# Patient Record
Sex: MALE | Race: WHITE | NOT HISPANIC OR LATINO | Employment: OTHER | URBAN - METROPOLITAN AREA
[De-identification: names, ages, dates, MRNs, and addresses within clinical notes are randomized per-mention and may not be internally consistent; named-entity substitution may affect disease eponyms.]

---

## 2017-05-16 ENCOUNTER — RECORDS - HEALTHEAST (OUTPATIENT)
Dept: LAB | Facility: CLINIC | Age: 72
End: 2017-05-16

## 2017-05-16 LAB
CHOLEST SERPL-MCNC: 207 MG/DL
FASTING STATUS PATIENT QL REPORTED: YES
HDLC SERPL-MCNC: 38 MG/DL
LDLC SERPL CALC-MCNC: 149 MG/DL
PSA SERPL-MCNC: 0.6 NG/ML (ref 0–6.5)
TRIGL SERPL-MCNC: 100 MG/DL

## 2018-06-05 ENCOUNTER — RECORDS - HEALTHEAST (OUTPATIENT)
Dept: LAB | Facility: CLINIC | Age: 73
End: 2018-06-05

## 2018-06-05 LAB
ALBUMIN SERPL-MCNC: 4 G/DL (ref 3.5–5)
ALP SERPL-CCNC: 46 U/L (ref 45–120)
ALT SERPL W P-5'-P-CCNC: 30 U/L (ref 0–45)
ANION GAP SERPL CALCULATED.3IONS-SCNC: 13 MMOL/L (ref 5–18)
AST SERPL W P-5'-P-CCNC: 40 U/L (ref 0–40)
BILIRUB SERPL-MCNC: 0.9 MG/DL (ref 0–1)
BUN SERPL-MCNC: 16 MG/DL (ref 8–28)
CALCIUM SERPL-MCNC: 9.2 MG/DL (ref 8.5–10.5)
CHLORIDE BLD-SCNC: 107 MMOL/L (ref 98–107)
CHOLEST SERPL-MCNC: 182 MG/DL
CO2 SERPL-SCNC: 21 MMOL/L (ref 22–31)
CREAT SERPL-MCNC: 0.81 MG/DL (ref 0.7–1.3)
FASTING STATUS PATIENT QL REPORTED: YES
GFR SERPL CREATININE-BSD FRML MDRD: >60 ML/MIN/1.73M2
GLUCOSE BLD-MCNC: 108 MG/DL (ref 70–125)
HDLC SERPL-MCNC: 35 MG/DL
LDLC SERPL CALC-MCNC: 118 MG/DL
POTASSIUM BLD-SCNC: 4.2 MMOL/L (ref 3.5–5)
PROT SERPL-MCNC: 7.5 G/DL (ref 6–8)
PSA SERPL-MCNC: 1 NG/ML (ref 0–6.5)
SODIUM SERPL-SCNC: 141 MMOL/L (ref 136–145)
TRIGL SERPL-MCNC: 147 MG/DL

## 2019-01-21 ENCOUNTER — RECORDS - HEALTHEAST (OUTPATIENT)
Dept: LAB | Facility: CLINIC | Age: 74
End: 2019-01-21

## 2019-01-22 LAB
ANION GAP SERPL CALCULATED.3IONS-SCNC: 15 MMOL/L (ref 5–18)
BUN SERPL-MCNC: 16 MG/DL (ref 8–28)
CALCIUM SERPL-MCNC: 9.3 MG/DL (ref 8.5–10.5)
CHLORIDE BLD-SCNC: 105 MMOL/L (ref 98–107)
CO2 SERPL-SCNC: 20 MMOL/L (ref 22–31)
CREAT SERPL-MCNC: 0.8 MG/DL (ref 0.7–1.3)
GFR SERPL CREATININE-BSD FRML MDRD: >60 ML/MIN/1.73M2
GLUCOSE BLD-MCNC: 104 MG/DL (ref 70–125)
POTASSIUM BLD-SCNC: 4.1 MMOL/L (ref 3.5–5)
SODIUM SERPL-SCNC: 140 MMOL/L (ref 136–145)

## 2019-06-06 ENCOUNTER — RECORDS - HEALTHEAST (OUTPATIENT)
Dept: LAB | Facility: CLINIC | Age: 74
End: 2019-06-06

## 2019-06-06 LAB
ALBUMIN SERPL-MCNC: 3.9 G/DL (ref 3.5–5)
ALP SERPL-CCNC: 43 U/L (ref 45–120)
ALT SERPL W P-5'-P-CCNC: 34 U/L (ref 0–45)
ANION GAP SERPL CALCULATED.3IONS-SCNC: 10 MMOL/L (ref 5–18)
AST SERPL W P-5'-P-CCNC: 36 U/L (ref 0–40)
BILIRUB SERPL-MCNC: 0.9 MG/DL (ref 0–1)
BUN SERPL-MCNC: 11 MG/DL (ref 8–28)
CALCIUM SERPL-MCNC: 9 MG/DL (ref 8.5–10.5)
CHLORIDE BLD-SCNC: 107 MMOL/L (ref 98–107)
CHOLEST SERPL-MCNC: 191 MG/DL
CO2 SERPL-SCNC: 20 MMOL/L (ref 22–31)
CREAT SERPL-MCNC: 0.74 MG/DL (ref 0.7–1.3)
FASTING STATUS PATIENT QL REPORTED: YES
GFR SERPL CREATININE-BSD FRML MDRD: >60 ML/MIN/1.73M2
GLUCOSE BLD-MCNC: 122 MG/DL (ref 70–125)
HDLC SERPL-MCNC: 38 MG/DL
LDLC SERPL CALC-MCNC: 121 MG/DL
POTASSIUM BLD-SCNC: 4.2 MMOL/L (ref 3.5–5)
PROT SERPL-MCNC: 7.4 G/DL (ref 6–8)
PSA SERPL-MCNC: 0.7 NG/ML (ref 0–6.5)
SODIUM SERPL-SCNC: 137 MMOL/L (ref 136–145)
TRIGL SERPL-MCNC: 159 MG/DL

## 2020-09-29 ENCOUNTER — RECORDS - HEALTHEAST (OUTPATIENT)
Dept: LAB | Facility: CLINIC | Age: 75
End: 2020-09-29

## 2020-09-29 LAB
ALBUMIN SERPL-MCNC: 3.8 G/DL (ref 3.5–5)
ALP SERPL-CCNC: 43 U/L (ref 45–120)
ALT SERPL W P-5'-P-CCNC: 23 U/L (ref 0–45)
ANION GAP SERPL CALCULATED.3IONS-SCNC: 10 MMOL/L (ref 5–18)
AST SERPL W P-5'-P-CCNC: 26 U/L (ref 0–40)
BILIRUB SERPL-MCNC: 0.6 MG/DL (ref 0–1)
BUN SERPL-MCNC: 11 MG/DL (ref 8–28)
CALCIUM SERPL-MCNC: 8.8 MG/DL (ref 8.5–10.5)
CHLORIDE BLD-SCNC: 104 MMOL/L (ref 98–107)
CHOLEST SERPL-MCNC: 222 MG/DL
CO2 SERPL-SCNC: 24 MMOL/L (ref 22–31)
CREAT SERPL-MCNC: 0.79 MG/DL (ref 0.7–1.3)
FASTING STATUS PATIENT QL REPORTED: YES
GFR SERPL CREATININE-BSD FRML MDRD: >60 ML/MIN/1.73M2
GLUCOSE BLD-MCNC: 134 MG/DL (ref 70–125)
HDLC SERPL-MCNC: 34 MG/DL
LDLC SERPL CALC-MCNC: 113 MG/DL
LDLC SERPL CALC-MCNC: ABNORMAL MG/DL
POTASSIUM BLD-SCNC: 4.3 MMOL/L (ref 3.5–5)
PROT SERPL-MCNC: 7.8 G/DL (ref 6–8)
PSA SERPL-MCNC: 0.9 NG/ML (ref 0–6.5)
SODIUM SERPL-SCNC: 138 MMOL/L (ref 136–145)
TRIGL SERPL-MCNC: 507 MG/DL

## 2020-10-08 ENCOUNTER — SURGERY - HEALTHEAST (OUTPATIENT)
Dept: CARDIOLOGY | Facility: CLINIC | Age: 75
End: 2020-10-08

## 2020-10-08 ASSESSMENT — MIFFLIN-ST. JEOR: SCORE: 1825.25

## 2020-10-09 ENCOUNTER — AMBULATORY - HEALTHEAST (OUTPATIENT)
Dept: CARDIAC REHAB | Facility: CLINIC | Age: 75
End: 2020-10-09

## 2020-10-09 ENCOUNTER — COMMUNICATION - HEALTHEAST (OUTPATIENT)
Dept: SCHEDULING | Facility: CLINIC | Age: 75
End: 2020-10-09

## 2020-10-11 ASSESSMENT — MIFFLIN-ST. JEOR: SCORE: 1770.02

## 2020-10-12 ASSESSMENT — MIFFLIN-ST. JEOR: SCORE: 1778.18

## 2020-10-29 ENCOUNTER — AMBULATORY - HEALTHEAST (OUTPATIENT)
Dept: CARDIOLOGY | Facility: CLINIC | Age: 75
End: 2020-10-29

## 2020-10-29 ENCOUNTER — RECORDS - HEALTHEAST (OUTPATIENT)
Dept: ADMINISTRATIVE | Facility: OTHER | Age: 75
End: 2020-10-29

## 2020-10-30 ENCOUNTER — OFFICE VISIT - HEALTHEAST (OUTPATIENT)
Dept: CARDIOLOGY | Facility: CLINIC | Age: 75
End: 2020-10-30

## 2020-10-30 DIAGNOSIS — I21.11 ST ELEVATION MYOCARDIAL INFARCTION INVOLVING RIGHT CORONARY ARTERY (H): ICD-10-CM

## 2020-11-16 ENCOUNTER — COMMUNICATION - HEALTHEAST (OUTPATIENT)
Dept: CARDIOLOGY | Facility: CLINIC | Age: 75
End: 2020-11-16

## 2020-11-17 ENCOUNTER — COMMUNICATION - HEALTHEAST (OUTPATIENT)
Dept: CARDIOLOGY | Facility: CLINIC | Age: 75
End: 2020-11-17

## 2020-11-17 ENCOUNTER — OFFICE VISIT - HEALTHEAST (OUTPATIENT)
Dept: CARDIOLOGY | Facility: CLINIC | Age: 75
End: 2020-11-17

## 2020-11-17 DIAGNOSIS — I21.11 ST ELEVATION MYOCARDIAL INFARCTION INVOLVING RIGHT CORONARY ARTERY (H): ICD-10-CM

## 2020-11-17 DIAGNOSIS — I48.91 NEW ONSET ATRIAL FIBRILLATION (H): ICD-10-CM

## 2020-11-17 DIAGNOSIS — I10 ESSENTIAL HYPERTENSION: ICD-10-CM

## 2020-11-17 DIAGNOSIS — E78.49 OTHER HYPERLIPIDEMIA: ICD-10-CM

## 2020-11-20 ENCOUNTER — AMBULATORY - HEALTHEAST (OUTPATIENT)
Dept: CARDIAC REHAB | Facility: CLINIC | Age: 75
End: 2020-11-20

## 2020-11-20 DIAGNOSIS — I21.11 ST ELEVATION MYOCARDIAL INFARCTION INVOLVING RIGHT CORONARY ARTERY (H): ICD-10-CM

## 2020-11-20 DIAGNOSIS — Z95.5 S/P DRUG ELUTING CORONARY STENT PLACEMENT: ICD-10-CM

## 2020-11-23 ENCOUNTER — COMMUNICATION - HEALTHEAST (OUTPATIENT)
Dept: CARDIOLOGY | Facility: CLINIC | Age: 75
End: 2020-11-23

## 2020-11-23 DIAGNOSIS — I48.91 NEW ONSET ATRIAL FIBRILLATION (H): ICD-10-CM

## 2020-11-24 ENCOUNTER — AMBULATORY - HEALTHEAST (OUTPATIENT)
Dept: CARDIAC REHAB | Facility: CLINIC | Age: 75
End: 2020-11-24

## 2020-11-24 DIAGNOSIS — I21.11 ST ELEVATION MYOCARDIAL INFARCTION INVOLVING RIGHT CORONARY ARTERY (H): ICD-10-CM

## 2020-11-24 DIAGNOSIS — Z95.5 S/P DRUG ELUTING CORONARY STENT PLACEMENT: ICD-10-CM

## 2020-11-25 RX ORDER — SOTALOL HYDROCHLORIDE 80 MG/1
80 TABLET ORAL 2 TIMES DAILY
Qty: 60 TABLET | Refills: 0 | Status: SHIPPED
Start: 2020-11-25 | End: 2021-12-08

## 2020-12-01 ENCOUNTER — AMBULATORY - HEALTHEAST (OUTPATIENT)
Dept: CARDIAC REHAB | Facility: CLINIC | Age: 75
End: 2020-12-01

## 2020-12-01 DIAGNOSIS — I21.11 ST ELEVATION MYOCARDIAL INFARCTION INVOLVING RIGHT CORONARY ARTERY (H): ICD-10-CM

## 2020-12-01 DIAGNOSIS — Z95.5 S/P DRUG ELUTING CORONARY STENT PLACEMENT: ICD-10-CM

## 2020-12-03 ENCOUNTER — AMBULATORY - HEALTHEAST (OUTPATIENT)
Dept: CARDIAC REHAB | Facility: CLINIC | Age: 75
End: 2020-12-03

## 2020-12-03 DIAGNOSIS — I21.11 ST ELEVATION MYOCARDIAL INFARCTION INVOLVING RIGHT CORONARY ARTERY (H): ICD-10-CM

## 2020-12-03 DIAGNOSIS — Z95.5 S/P DRUG ELUTING CORONARY STENT PLACEMENT: ICD-10-CM

## 2020-12-04 ENCOUNTER — OFFICE VISIT - HEALTHEAST (OUTPATIENT)
Dept: CARDIOLOGY | Facility: CLINIC | Age: 75
End: 2020-12-04

## 2020-12-04 DIAGNOSIS — I25.10 CORONARY ARTERY DISEASE INVOLVING NATIVE CORONARY ARTERY OF NATIVE HEART WITHOUT ANGINA PECTORIS: ICD-10-CM

## 2020-12-04 DIAGNOSIS — I48.0 PAROXYSMAL ATRIAL FIBRILLATION (H): ICD-10-CM

## 2020-12-04 LAB
ATRIAL RATE - MUSE: 47 BPM
DIASTOLIC BLOOD PRESSURE - MUSE: NORMAL
INTERPRETATION ECG - MUSE: NORMAL
P AXIS - MUSE: -16 DEGREES
PR INTERVAL - MUSE: 150 MS
QRS DURATION - MUSE: 78 MS
QT - MUSE: 462 MS
QTC - MUSE: 408 MS
R AXIS - MUSE: -14 DEGREES
SYSTOLIC BLOOD PRESSURE - MUSE: NORMAL
T AXIS - MUSE: -19 DEGREES
VENTRICULAR RATE- MUSE: 47 BPM

## 2020-12-08 ENCOUNTER — AMBULATORY - HEALTHEAST (OUTPATIENT)
Dept: CARDIAC REHAB | Facility: CLINIC | Age: 75
End: 2020-12-08

## 2020-12-08 DIAGNOSIS — Z95.5 S/P DRUG ELUTING CORONARY STENT PLACEMENT: ICD-10-CM

## 2020-12-08 DIAGNOSIS — I21.11 ST ELEVATION MYOCARDIAL INFARCTION INVOLVING RIGHT CORONARY ARTERY (H): ICD-10-CM

## 2020-12-10 ENCOUNTER — AMBULATORY - HEALTHEAST (OUTPATIENT)
Dept: CARDIAC REHAB | Facility: CLINIC | Age: 75
End: 2020-12-10

## 2020-12-10 DIAGNOSIS — I21.11 ST ELEVATION MYOCARDIAL INFARCTION INVOLVING RIGHT CORONARY ARTERY (H): ICD-10-CM

## 2020-12-10 DIAGNOSIS — Z95.5 S/P DRUG ELUTING CORONARY STENT PLACEMENT: ICD-10-CM

## 2020-12-15 ENCOUNTER — AMBULATORY - HEALTHEAST (OUTPATIENT)
Dept: CARDIAC REHAB | Facility: CLINIC | Age: 75
End: 2020-12-15

## 2020-12-15 DIAGNOSIS — Z95.5 S/P DRUG ELUTING CORONARY STENT PLACEMENT: ICD-10-CM

## 2020-12-15 DIAGNOSIS — I21.11 ST ELEVATION MYOCARDIAL INFARCTION INVOLVING RIGHT CORONARY ARTERY (H): ICD-10-CM

## 2020-12-17 ENCOUNTER — AMBULATORY - HEALTHEAST (OUTPATIENT)
Dept: CARDIAC REHAB | Facility: CLINIC | Age: 75
End: 2020-12-17

## 2020-12-17 DIAGNOSIS — I21.11 ST ELEVATION MYOCARDIAL INFARCTION INVOLVING RIGHT CORONARY ARTERY (H): ICD-10-CM

## 2020-12-17 DIAGNOSIS — Z95.5 S/P DRUG ELUTING CORONARY STENT PLACEMENT: ICD-10-CM

## 2020-12-22 ENCOUNTER — AMBULATORY - HEALTHEAST (OUTPATIENT)
Dept: CARDIAC REHAB | Facility: CLINIC | Age: 75
End: 2020-12-22

## 2020-12-22 DIAGNOSIS — Z95.5 S/P DRUG ELUTING CORONARY STENT PLACEMENT: ICD-10-CM

## 2020-12-22 DIAGNOSIS — I21.11 ST ELEVATION MYOCARDIAL INFARCTION INVOLVING RIGHT CORONARY ARTERY (H): ICD-10-CM

## 2020-12-28 ENCOUNTER — AMBULATORY - HEALTHEAST (OUTPATIENT)
Dept: CARDIAC REHAB | Facility: CLINIC | Age: 75
End: 2020-12-28

## 2020-12-28 DIAGNOSIS — Z95.5 S/P DRUG ELUTING CORONARY STENT PLACEMENT: ICD-10-CM

## 2020-12-28 DIAGNOSIS — I21.11 ST ELEVATION MYOCARDIAL INFARCTION INVOLVING RIGHT CORONARY ARTERY (H): ICD-10-CM

## 2020-12-31 ENCOUNTER — AMBULATORY - HEALTHEAST (OUTPATIENT)
Dept: CARDIAC REHAB | Facility: CLINIC | Age: 75
End: 2020-12-31

## 2020-12-31 DIAGNOSIS — I21.11 ST ELEVATION MYOCARDIAL INFARCTION INVOLVING RIGHT CORONARY ARTERY (H): ICD-10-CM

## 2020-12-31 DIAGNOSIS — Z95.5 S/P DRUG ELUTING CORONARY STENT PLACEMENT: ICD-10-CM

## 2021-01-14 ENCOUNTER — COMMUNICATION - HEALTHEAST (OUTPATIENT)
Dept: CARDIOLOGY | Facility: CLINIC | Age: 76
End: 2021-01-14

## 2021-01-14 DIAGNOSIS — R00.1 BRADYCARDIA: ICD-10-CM

## 2021-01-14 DIAGNOSIS — I21.11 ST ELEVATION MYOCARDIAL INFARCTION INVOLVING RIGHT CORONARY ARTERY (H): ICD-10-CM

## 2021-01-14 RX ORDER — ATORVASTATIN CALCIUM 80 MG/1
80 TABLET, FILM COATED ORAL DAILY
Qty: 90 TABLET | Refills: 3 | Status: SHIPPED | OUTPATIENT
Start: 2021-01-14 | End: 2021-12-08

## 2021-03-30 ENCOUNTER — RECORDS - HEALTHEAST (OUTPATIENT)
Dept: LAB | Facility: CLINIC | Age: 76
End: 2021-03-30

## 2021-03-30 LAB
ALBUMIN SERPL-MCNC: 3.8 G/DL (ref 3.5–5)
ALP SERPL-CCNC: 51 U/L (ref 45–120)
ALT SERPL W P-5'-P-CCNC: 19 U/L (ref 0–45)
ANION GAP SERPL CALCULATED.3IONS-SCNC: 13 MMOL/L (ref 5–18)
AST SERPL W P-5'-P-CCNC: 23 U/L (ref 0–40)
BILIRUB SERPL-MCNC: 0.8 MG/DL (ref 0–1)
BUN SERPL-MCNC: 13 MG/DL (ref 8–28)
CALCIUM SERPL-MCNC: 8.7 MG/DL (ref 8.5–10.5)
CHLORIDE BLD-SCNC: 105 MMOL/L (ref 98–107)
CHOLEST SERPL-MCNC: 124 MG/DL
CO2 SERPL-SCNC: 22 MMOL/L (ref 22–31)
CREAT SERPL-MCNC: 0.77 MG/DL (ref 0.7–1.3)
FASTING STATUS PATIENT QL REPORTED: ABNORMAL
GFR SERPL CREATININE-BSD FRML MDRD: >60 ML/MIN/1.73M2
GLUCOSE BLD-MCNC: 117 MG/DL (ref 70–125)
HDLC SERPL-MCNC: 36 MG/DL
LDLC SERPL CALC-MCNC: 53 MG/DL
POTASSIUM BLD-SCNC: 4.1 MMOL/L (ref 3.5–5)
PROT SERPL-MCNC: 7.8 G/DL (ref 6–8)
SODIUM SERPL-SCNC: 140 MMOL/L (ref 136–145)
TRIGL SERPL-MCNC: 177 MG/DL

## 2021-05-26 ENCOUNTER — RECORDS - HEALTHEAST (OUTPATIENT)
Dept: ADMINISTRATIVE | Facility: CLINIC | Age: 76
End: 2021-05-26

## 2021-06-04 VITALS
OXYGEN SATURATION: 98 % | HEART RATE: 50 BPM | SYSTOLIC BLOOD PRESSURE: 130 MMHG | DIASTOLIC BLOOD PRESSURE: 76 MMHG | WEIGHT: 224 LBS | BODY MASS INDEX: 32.14 KG/M2

## 2021-06-04 VITALS — WEIGHT: 227.5 LBS | BODY MASS INDEX: 32.57 KG/M2 | HEIGHT: 70 IN

## 2021-06-05 VITALS — WEIGHT: 222 LBS | BODY MASS INDEX: 31.85 KG/M2

## 2021-06-05 VITALS — BODY MASS INDEX: 31.42 KG/M2 | WEIGHT: 219 LBS

## 2021-06-05 VITALS
DIASTOLIC BLOOD PRESSURE: 76 MMHG | SYSTOLIC BLOOD PRESSURE: 124 MMHG | RESPIRATION RATE: 16 BRPM | HEART RATE: 48 BPM | OXYGEN SATURATION: 98 % | WEIGHT: 223.25 LBS | BODY MASS INDEX: 32.03 KG/M2

## 2021-06-05 VITALS — BODY MASS INDEX: 32.14 KG/M2 | WEIGHT: 224 LBS

## 2021-06-05 VITALS — WEIGHT: 221 LBS | BODY MASS INDEX: 31.71 KG/M2

## 2021-06-05 VITALS — WEIGHT: 218 LBS | BODY MASS INDEX: 31.28 KG/M2

## 2021-06-05 VITALS — BODY MASS INDEX: 32.36 KG/M2 | WEIGHT: 225.5 LBS

## 2021-06-05 VITALS — WEIGHT: 222.6 LBS | BODY MASS INDEX: 31.94 KG/M2

## 2021-06-13 NOTE — TELEPHONE ENCOUNTER
----- Message from Alvin Cannon MD sent at 11/20/2020  3:38 PM CST -----  Regarding: RE: Ectopy in cardiac rehab  Thank you - he should continue sotalol 80mg two times a day if no symptoms of lightheadedness or dizzy spells,.  If dizzy or lightheaded or HR <50, agree with 40mg two times a day sotalol..   thanks  ----- Message -----  From: Anel Buchanan RN  Sent: 11/20/2020  11:30 AM CST  To: Alvin Cannon MD, #  Subject: Ectopy in cardiac rehab                          Hello,    Our mutual pt just started cardiac rehab today and was noted to have frequent multifocal PVC's in bigeminy pattern throughout all of his 6'walk. VSS and pt was asymptomatic. Resolved at rest. Rare PAC's and fusion complexes noted at rest. At rest HR: 53, BP: 112/60, O2: 97% RA. HR:  during 6'walk, BP: 148/68, O2: 98% RA. ECG scanned in under [facility].     Lauryn left him a voicemail on 11/17 decreasing his Sotalol down to 40mg two times a day . Pt had not seen that voicemail but when he was here we found it so he could adjust his dose starting today. He had not taken his sotalol this AM due to his HR 50bpm.     Thank you so much!  Anel Buchanan RN   Cardiac Rehab  [phone]

## 2021-06-13 NOTE — PATIENT INSTRUCTIONS - HE
Shimon Duran,    It was a pleasure to see you today at the [facility].     My recommendations after this visit include:  - I will talk with Dr. Cannon about sotalol dose.    - Continue your exercise routine.   - Please call my nurse Lauryn if you have any concerns: [phone]    Tammy George CNP      Medication     o Take all your medications as prescribed  o Do not stop any medications without talking with a healthcare provider    Exercise      o Physical activity is important for overall health  o Set a goal of 150 minutes of exercise each week  o For example, 30 minutes of exercise 5 days each week.    o These 30 minutes can be broken into shorter periods of 15 minutes twice daily or 10 minutes three times daily  o Start any exercise program slowly and work towards the goal of 150 minutes each week  o For example, you may start with 10 minutes and plan to add a few minutes each week as you get stronger   o Examples of exercise include walking, swimming, or biking  o Remember to stretch and stay hydrated with exercise    Diet     o A heart healthy diet includes:  o A variety of fruits and vegetables  o Whole grains  o Low-fat dairy (fat-free, 1% fat, and low-fat)  o Lean meats and poultry without skin   o Fish (eat fish 2 times each week)  o Nuts  o Limit saturated fat to about 13 grams each day (based on a 2000 calorie diet)  o Limit red meat  o Limit sugars (sweets and sugary beverages)  o Limit your portion sizes  o Do not add salt to your food when cooking or at the table  o Limit alcohol intake (no more than 1 drink each day for women or 2 drinks each day for men)    Weight Loss     o Work on losing weight with diet and exercise  o You BMI (body mass index) should be between 18.5-24.9  o This is a calculation of your weight and height  o Please ask your healthcare provider for your BMI    Manage Other Chronic Health Conditions     o Control cholesterol  o Eat a diet low in saturated  fat  o Exercise   o Take a statin medication as prescribed  o Manage blood pressure  o Eat a diet low in sodium  o Exercise  o Reduce stress  o Lose weight   o Take blood pressure medications as prescribed  o Control blood sugars if diabetic  o Monitor sugars and carbohydrates in your diet  o Lose weight   o Take diabetes medications as prescribed  o Follow-up with your primary care provider to make sure your blood sugars are well controlled    Stress Reduction     o Find time each day to relax  o Reading, listening to music, yoga, meditation, exercise, spending time with friends and family, volunteering   o Get 6-8 hours of sleep each night    Smoking Cessation     o Smoking causes numerous health problems including coronary artery disease  o It is never too late to quit  o Set realistic goals for quitting  o Decrease the number of cigarettes used each week  o Use nicotine gum or patches to help you quit    Information from the American Heart Association.  Please visit their website at www.heart.org

## 2021-06-13 NOTE — PROGRESS NOTES
Cardiac Rehab  Phase II Assessment    Assessment Date: 11/20/2020    Diagnosis: STEMI   Date of Onset: 10/8/2020  Procedure: PCI w/BO x 1 to RPL of RCA  Date of Onset: 10/8/2020  ICD/Pacemaker: No  Post-op Complications: Paroxysmal A-fib- started on beta blocker and eliquis  ECG History: SB/SR w/non specific T wave abnormality, PAF -SB/SR at discharge  EF%: 55-60% (10/9/2020)  Past Medical History: HTN, Hyperlipidemia, Family hx, Paroxysmal A-fib- started on sotalol and eliquis.  50% pLCx stenosis remaining- if symptoms- stress nuclear imaging is recommended  Past Medical History:   Diagnosis Date     Hyperlipidemia      Hypertension      Past Surgical History:   Procedure Laterality Date     CHOLECYSTECTOMY       CV CORONARY ANGIOGRAM N/A 10/8/2020    Procedure: Coronary Angiogram;  Surgeon: Alvin Cannon MD;  Location: [facility];  Service: Cardiology     Patient Active Problem List   Diagnosis     STEMI (ST elevation myocardial infarction) (H)     ACS (acute coronary syndrome) (H)     New onset atrial fibrillation (H)     Essential hypertension     Other hyperlipidemia       Physical Assessment  Precautions/ Physical Limitations: Standard cardiac symptoms   Oxygen: No  O2 Sats: 97% RA at rest Lung Sounds: Clear in all lobes posterior Edema: Trace B ankle edema   Incisions: L radial wrist healed well  Sleeping Pattern: good   Appetite: good    Nutrition Risk Screen: Completed    Pain    Intensity: (0-10 scale) 0      Psychosocial/ Emotional Health  1. In the past 12 months, have you been in a relationship where you have been abused physically, emotionally, sexually or financially? No  notified: NA  2. Who do you turn to for emotional support?: Wife's sister , Tammy   3. Do you have cultural or spiritual needs? No  4. Have there been any major life changes in the past 12 months? Yes , Pt and wife's declining health     Referral Information  Primary Physician: Mode Adkins,  MD  Cardiologist: Dr. Cannon      Home exercise/Equipment: Free Weights, [place] Track, Mini exercise Trampoline     Patient's long-term goal(s): 1.  Increase endurance and stamina  2. Continue to learn about heart disease and feel more confident     1. Living Accommodations: Home Steps: Yes- 1 flight of 12 steps       Support people at home: Wife   2. Marital Status:   3. Family is not able to assist with cares      Synagogue/Community involvement: Attends Hinduism regularly   4. Recreation/Hobbies: Make jewelry-stone cutting, Welding/Projects around the house, Exercising

## 2021-06-13 NOTE — TELEPHONE ENCOUNTER
LM for pt today to reduce Sotalol down to 40mg two times a day as per Dr. Cannon.   Advised to return call if any questions or concerns. sk/RN

## 2021-06-13 NOTE — TELEPHONE ENCOUNTER
----- Message from Tammy Vinson CNP sent at 11/17/2020 12:16 PM CST -----  Can you please call patient and have him decrease sotalol to 40 mg twice daily per Dr. Cannon?    Thanks   ----- Message -----  From: Alvin Cannon MD  Sent: 11/17/2020  12:09 PM CST  To: Tammy Vinson CNP    Can you ask him to lower to 40mg two times a day?  thanks  ----- Message -----  From: Tammy Vinson CNP  Sent: 11/17/2020  10:07 AM CST  To: Alvin Cannon MD    I saw Grow today for post STEMI appointment.  BO to RPL on 10/8/2020.  He was also diagnosed with new Afib in hospital. He is taking sotalol 80 mg every 12 hours.  HR regular today and 50bpm.  His home HRs are 48-80s.  He is asymptomatic with low HRs.  He was told to hold sotalol if HRs in the 50s so he has only been taking sotalol about 50% of the time.  Do you want to adjust sotalol dose or continue with current plan?    He is seeing you on 12/4.     Thanks

## 2021-06-13 NOTE — TELEPHONE ENCOUNTER
Wellness Screening Tool  Symptom Screening:  Do you have one of the following NEW symptoms:    Fever (subjective or >100.0)?  No    A new cough?  No    Shortness of breath?  No     Chills? No     New loss of taste or smell? No     Generalized body aches? No     New persistent headache? No     New sore throat? No     Nausea, vomiting, or diarrhea?  No    Within the past 2 weeks, have you been exposed to someone with a known positive illness below:    COVID-19 (known or suspected)?  No    Chicken pox?  No    Mealses?  No    Pertussis?  No    Patient notified of visitor policy- No visitors are allowed to accompany the patient at this time. Yes  Pt informed to wear a mask: Yes  Pt notified if they develop any symptoms listed above, prior to their appointment, they are to call the clinic directly at [phone] for further instructions.  Yes  Patient's appointment status: Patient will be seen in clinic as scheduled on 11/17

## 2021-06-13 NOTE — PATIENT INSTRUCTIONS - HE
Stop clopidogrel in May and then start aspirin 81mg daily    If heart rate ranges 30-50 on average around 40 or dizzy/lightheaded , would lower sotalol to 40mg twice a day.  (let us know if you do this)     Interval exercise training preferred

## 2021-06-13 NOTE — PROGRESS NOTES
ITP ASSESSMENT   Assessment Day: Initial    Session Number: 1/2  Precautions: Standard cardiac sx/sx , Frequent PVC's with exercise, 50% pLCx stenosis    Diagnosis: MI;Stent    Risk Stratification: High    Referring Provider: Alvin Cannon,*   ITP: Dr. Moody  EXERCISE  Exercise Assessment: Initial       6 Minute Walk Test   Pre   Pre Exercise HR: 53                    Pre Exercise BP: 112/60      Peak  Peak HR:  (bigeminy PVC's )                   Peak BP: 148/68    Peak feet: 1250    Peak O2 SAT: 98    Peak RPE: 3-4    Peak MPH: 2.37      Symptoms:  Peak Symptoms: denies cv sx/sx       5 mins. 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     Leisure: Goal #2: Pt to increase upper body strength by utilizing arm ergometer and/or weights 2lbs 1 set of 10 reps 1-2x/week.    Work: LTG: Pt to reach a 6-8 MET level to return to climbing ladders, using his mini trampoline at a light jog for and gardening/yardwork- push mow the lawn without any cv sx/sx.         Education Goals: Patient can state cardiac s/s and appropriate emergency response.    Education Goals Met: Medication review.;Has system for taking medication.      Exercise Prescription  Exercise Mode: Treadmill;Bike;Nustep;Arm Erg.;Stairs;Hallway Walking    Frequency: 2x/week    Duration: 30-40 minutes    Intensity / THR: 20-30 beats above resting heart rate    RPE 11-14  Progression / Met level: 2.8-4+    Resistive Training?: Yes      Current Exercise (mins/week): 300 (walking)      Interventions  Home Exercise:  Mode: Walking     Frequency: 3-4x/week    Duration: 30-60 minutes      Education Material : Provide written material;Individual education and counseling (PH II Folder reviewed and given  "to patient. )      Education Completed  Exercise Education Completed: Cardiac Anatomy;Signs and Symptoms;Medication review;RPE;Emergency Plan;Home Exercise;Warm up/cool down;FITT Principles;BP/HR Reponse to exercise;Stretching;Strength training;Benefits of Exercise;End point of exercise              Exercise Follow-up/Discharge  Follow up/Discharge: Skilled therapy needed to monitor pt for any EKG changes or arrhtyhmias (hx of PAF and also frequent PVC's with 6'walk). Pt has known 50% in pLx so continue to monitor for any anginal symptoms. Guide and safely progress pt to a 6-8 MET Level to get back to climbing ladders, yardwork. Continue to monitor for proper cv response to exercise and educate pt on CAD diagonsis. Pt is walking for HEP diligently and is motivated to increase endurance and stamina.    NUTRITION  Nutrition Assessment: Initial      Nutrition Risk Factors:  Nutrition Risk Factors: Dyslipidemia;Overweight  Cholesterol: 220 (10/8/2020)  LDL: 113  HDL: 38  Triglycerides: 541      Nutrition Plan  Interventions  Diet Consult: NA (Initial session. )    Other Nutrition Intervention: Therapist/Pt Discussion;Provide with Written Material      Education Completed  Nutrition Education Completed: Low Saturated fat diet;Risk factor overview;Low sodium diet;Weight management      Goals  Nutrition Goals (Next 30 days): Patient can identify their risk factors for CAD;Patient will follow a low saturated fat diet;Patient knows appropriate portion size;Patient will lose weight;Improve Rate Your Plate Survey Score      Goals Met  Nutrition Goals Met: Completed Nutritional Risk Screen;Provided Rate your Plate Survey;Reviewed Dietitian schedule;Patient follows a low sodium diet      Height, Weight, and  BMI  Weight: 225 lb 8 oz (102.3 kg)  Height: 5' 10\" (1.778 m)  BMI: 32.36      Nutrition Follow-up  Follow-up/Discharge: Pt may be interested in meeting with the dietician. Pt is a vegetarian and had made changes to his diet " since his heart event. Pt is compliant with his statin and we did review cholesterol levels with pt today. Pt has cut down on sweets and also has not had pizza since heart event. PT does not add salt. Pt's wife does the cooking. Pt eats unsalted nuts and yogurt along with tofu and we talked about label reading. He is going to start watching saturated fats so decreasing his eggs down to 3x/week, and low fat yogart. Pt is drinking water for hydration.          Other Risk Factors  Other Risk Factor Assessment: Initial      HTN Risk Factor: Hypertension      Pre Exercise BP: 112/60  Post Exercise BP: 110/68      Hypertension Plan  Goals  HTN Goals: Patient demonstrates understanding of HTN, no goals identified for the next 30 days      Goals Met  HTN Goals Met: Take medication as prescribed;Follow low sodium diet;Exercises regularly      HTN Interventions  HTN Interventions: Therapist/patient discussion;Provide written material      HTN Education Completed  HTN Education Completed: Low sodium diet;Medication review;Risk factor overview      Tobacco Risk Factor: NA        Risk Factor Follow-up   Follow-up/Discharge: Pt compliant with Lotrel and is adjusting dose of Sotalol down to 40md BID. Resting and BP are WNL today. Pt is watching his BP 2x/day at home.      PSYCHOSOCIAL  Psychosocial Assessment: Initial       [place] COOP Q of L Summary Score: 14      PHQ-9 Total Score: 2      Psychosocial Risk Factor: Stress      Psychosocial Plan  Interventions    Interventions: Provide written material;Individual education and counseling       Education Completed  Education Completed: Relaxation/Coping Techniques;S/S of depression;Effects of stress on body      Goals  Goals (Next 30 days): Improvement in [place] COOP score;Practicing stress management skills      Goals Met  Goals Met: Identified Support system;Oriented to stress management classes;Identify stressors      Psychosocial Follow-up  Follow-up/Discharge: Pt does  admit to stress in his life and it comes mostly from caring for his wife who has declining health. Pt does report he talks to her sister for support. Pt denies any emotional changes following heart event and is optimistic about the future. Pt does attend [employer] regularly and he does enjoy exercising and projects around the house for relaxation.              Patient involved in Goal setting?: Yes

## 2021-06-13 NOTE — PROGRESS NOTES
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 Start intervals to safely progress METs to reach MET goals and change up modalties.    Goal #2: Continue with weight loss 0.5-1lb per week with LTG of 200lbs. Current weight of 218lbs    LTG: Pt to reach a 6-8 MET level to return to climbing ladders, using his mini trampoline at a light jog for and gardening/yardwork- push mow the lawn without any cv sx/sx in the next 3 months      Education Goals: Patient can state cardiac s/s and appropriate emergency response.    Education Goals Met: Medication review.;Has system for taking medication.                        Goals Met  Initial ADL's goals met: Goal not met: continue with goal and start interval training to safely progress pt to goals.    Initial Leisure goals met: Goal met-pt is lifting weights regularly at home-he has lifted weights 1x in CR with education of proper techniques reviewed 1:!    Intial Work goals met: LTG not met-continue with goals    Initial Progression: Pt has progressed to a 2.7-2.8METs for 30 minutes. Occasionally pt does have arrhythmias-Bigeminy. Pt denies cv s/s. He prefers to use Nustep vs TM-Pt willing to change up modalities more to progress to STG of 4-4.5METs+ to LTG of 6-8METs.      Exercise Prescription  Exercise Mode: Treadmill;Bike;Nustep;Arm Erg.;Stairs;Hallway Walking (Utlize both continous and interval ex.)    Frequency: 2x/week    Duration: 30-60 minutes    Intensity / THR: 20-30 beats above resting heart rate    RPE  11-14  Progression / Met level: 2.8-4.5+ LTG of 6-8METs    Resistive Training?: Yes (2.5-5lbs weights at home)      Current Exercise (mins/week): 465      Interventions  Home Exercise:  Mode: Walking    Frequency: non rehab days    Duration: 60 minutes      Education Material : Provide written material;Individual education and counseling      Education Completed  Exercise Education Completed: Cardiac Anatomy;Signs and Symptoms;Medication review;RPE;Emergency Plan;Home Exercise;Warm up/cool down;FITT Principles;BP/HR Reponse to exercise;Stretching;Strength training;Benefits of Exercise;End point of exercise              Exercise Follow-up/Discharge  Follow up/Discharge: Skilled therapy needed to continue to monitor EKG for arrhythmia concerns s/p STEMI/Stents. Pt has occasional PAC's, PVC's and Bigeminy-no cv s/s and does not feel ectopy. Start interval training to safely progress pt to STG of 4-4.5METs and LTG 6-8METs. Educate on safe progression of ex.    NUTRITION  Nutrition Assessment: Reassessment    Nutrition Risk Factors:  Nutrition Risk Factors: Dyslipidemia;Overweight  Cholesterol: 220 (10/8/2020) (Recent cholester levels since October.)  LDL: 113  HDL: 38  Triglycerides: 541      Nutrition Plan  Interventions  Diet Consult: Completed    Other Nutrition Intervention: Diet Class;Therapist/Pt Discussion;Provide with Written Material    Initial Rate Your Plate Score: 59    Education Completed  Nutrition Education Completed: Low Saturated fat diet;Low sodium diet;Weight management;Risk factor overview    Goals  Nutrition Goals (Next 30 days): Patient will lose weight;Patient knows appropriate portion size      Goals Met  Nutrition Goals Met: Patient follows a low sodium diet;Patient states following a low saturated fat diet;Patient knows appropriate portion size;Reviewed Dietitian schedule;Provided Rate your Plate Survey;Completed Nutritional Risk Screen;Patient has lost weight (Pt able to identfy some  "CRF-reviewed today)      Height, Weight, and  BMI  Weight: 218 lb (98.9 kg)  Height: 5' 10\" (1.778 m)  BMI: 31.28      Nutrition Follow-up  Follow-up/Discharge: JOHN diet educ completed 12/1/2020. Pt is following low fat, low salt and low cholesterol 90% of the time. Pt continues to take Lipitor as prescribed. Education on lowering triglycerides completed. Pt feels comfortable with heart healthy diet at this time. He found meeting with dietician very helpful. Continue with weight loss 0.5-1lbs/week with LTG of <200lbs       Other Risk Factors  Other Risk Factor Assessment: Reassessment      HTN Risk Factor: Hypertension      Pre Exercise BP: 114/62  Post Exercise BP: 98/60      Hypertension Plan  Goals  HTN Goals: Patient demonstrates understanding of HTN, no goals identified for the next 30 days      Goals Met  HTN Goals Met: Take medication as prescribed;Follow low sodium diet;Exercises regularly (Pt monitors BP's at home 2)      HTN Interventions  HTN Interventions: Therapist/patient discussion;Provide written material;Diet consult      HTN Education Completed  HTN Education Completed: Low sodium diet;Medication review;Risk factor overview      Tobacco Risk Factor: NA    Risk Factor Follow-up   Follow-up/Discharge: CRF: FHx, Overweight, DLD, HTN and Stress         PSYCHOSOCIAL  Psychosocial Assessment: Reassessment       [place] COOP Q of L Summary Score: 14      PHQ-9 Total Score: 2      Psychosocial Risk Factor: Stress      Psychosocial Plan  Interventions  Interventions: Provide written material;Individual education and counseling      Education Completed  Education Completed: Relaxation/Coping Techniques;S/S of depression;Effects of stress on body      Goals  Goals (Next 30 days): Patient demonstrates understanding of stress, no goals identified for the next 30 days;Improvement in [place] COOP score      Goals Met  Goals Met: Identified Support system;Oriented to stress management classes;Identify " stressors;Practicing stress management skills      Psychosocial Follow-up  Follow-up/Discharge: Pt continues to have moderate stress with his wifes health and 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 Pt feels that he manages his stressors well. He uses TV as a relaxor, walking, a list of graditude, listen to music, photography, pay it forward-helping other people.            Patient involved in Goal setting?: Yes

## 2021-06-13 NOTE — TELEPHONE ENCOUNTER
----- Message -----  From: Alvin Cannon MD  Sent: 11/24/2020   7:43 AM CST  To: Sofi Perera RN    He should cont sotalol and hold if HR <50 or having symptoms of dizzy spells on standing.   Follow up with afib clinic next available.      Thanks  Alvin Cruz message for patient to call back to review recommendations. -ejb    Recommendations reviewed with patient. Advised to continue Sotalol at 80 mg two times a day and HOLD if HR <50 or if symptoms of dizziness/lightheadedness or dizziness with standing.   Message sent to scheduling to contact patient to scheduled visit in Afib Clinic.   Patient will keep currently scheduled appointment with CJP on 12/4. -valeriob

## 2021-06-14 NOTE — PROGRESS NOTES
[facility] Heart Care Home Exercise Program/Discharge Summary  You have reached a 3.1 MET level and have completed 13 sessions of Cardiac Rehab.   Exercise Goals:   4-6x/week for aerobic exercise 30-60 minutes and 2-3x/week for strength training.   Modality Duration Intensity/  Rate of Perceived Exertion  OMNI Scale (1-10)   Warm-up 5 minutes 2-3   Walk 30-60 minutes 4-7   Bike 30 minutes 4-7   Treadmill 15-20 minutes  4-7   Cool Down 5 minutes 2-3   Strength Training *every other day 10-15 minutes 3 sets of 10 reps  Increase weights as tolerated.   Stretching 5 minutes 2-3   Continuous Exercise Heart Rate Guidelines:   20-30bpm> RHR (Resting Heart Rate) or [place] (60 to 75 bpm)  Special Recommendations:    Continue to follow low fat, low salt, heart healthy diet.    Continue to follow up with your doctors/providers as recommended (e.g cholesterol).    A well rounded exercise program will included aerobic/cardiovascular exercise (e.g like walking, biking, or swimming ), strength training (e.g. free weights, exercise bands, or weight machines) and stretching program.   Stop Exercise!!! If any of the following occur:    Angina/chest pain    Dizziness    Excessive perspiration/cold sweats    Abnormal shortness of breath    Changes in heart rate (slow, fast, irregular)    Sudden fatigue or numbness    Nausea  Also...    Avoid extreme temperatures - exercise indoors if necessary:   Temp+ Humidity >160, Temp-Wind Chill <20    Wait at least 1 hour after a meal before strenuous activity    Do not exercise if you have a fever or are ill    Wear comfortable, supportive athletic clothing and shoes.  You are now on your way to a heart healthy lifestyle on your own. You can do it!

## 2021-06-14 NOTE — TELEPHONE ENCOUNTER
----- Message -----  From: Alvin Cannon MD  Sent: 1/21/2021   3:49 PM CST  To: Sofi Perera RN    Was this addressed by me?  I would stay off sotalol and obtain a 24 hour monitor to see if HR too high or low.  thanks      Left message for patient to call back to review recommendations. -ejb    Response and recommendations reviewed. Understanding and agreement verbalized.   Orders placed.   Scheduling notified. -ejb

## 2021-06-14 NOTE — TELEPHONE ENCOUNTER
Dr. Cannon, please review update below. Any new recommendations? -ejb  --------------------------------------------------------    Return call to patient. He called to report lower heart rates over the 24 days. Heart rate always less than 50, averages between 38-48 bpm. Has not taken any Sotalol in the last 24 days. Patient does not have any symptoms r/t these low heart rates. -tianna    ----- Message from PATRICK Campos sent at 1/14/2021  9:50 AM CST -----  Regarding: [place] PATIENT  General phone call:    Caller: Patient    Primary cardiologist: POLINA    Detailed reason for call: Patient wanted to report to Dr. Cannon , His HR has been 38-48 bpm for the last 24 days.    Best phone number: [phone]    Best time to contact: any    Ok to leave a detailed message? Yes    Device? no    Additional Info:

## 2021-06-14 NOTE — PROGRESS NOTES
ITP ASSESSMENT   Assessment Day: 60 Day    Session Number: 12  Precautions: Standard cardiac sx/sx , Frequent PVC's with exercise, 50% pLCx stenosis    Diagnosis: MI;Stent    Risk Stratification: High    Referring Provider: Mode Adkins MD   ITPs sent to Dr. Moody   EXERCISE  Exercise Assessment: Discharge       6 Minute Walk Test   Pre   Pre Exercise HR: 47                    Pre Exercise BP: 108/64      Peak  Peak HR: 64                   Peak BP: 136/68    Peak feet: 1400    Peak O2 SAT: 100    Peak RPE: 4    Peak MPH: 2.65      Symptoms:  Peak Symptoms: denies sx      5 mins. 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 Start intervals to safely progress METs to reach MET goals and change up modalties.    Leisure: Goal #2: Continue with weight loss 0.5-1lb per week with LTG of 200lbs. Current weight of 218lbs    Work: LTG: Pt to reach a 6-8 MET level to return to climbing ladders, using his mini trampoline at a light jog for and gardening/yardwork- push mow the lawn without any cv sx/sx in the next 3 months    Education Goals Met: Medication review.;Has system for taking medication.                          Goals Met    30 Day Progression: Pt has reached 2.7 METs on TM, 3.1 METs on Nustep and 2.8 METs on arm ergometor. Pt wants to discharge.       Initial ADL's goals met: Goal not met: continue with goal and start interval training to safely progress pt to goals.    Initial Leisure goals met: Goal met-pt is lifting weights regularly at home-he has lifted weights 1x in CR with education of proper techniques reviewed 1:!    Intial Work goals met: LTG not met-continue with goals    Initial Progression: Pt has progressed to a 2.7-2.8METs for 30 minutes.  Occasionally pt does have arrhythmias-Bigeminy. Pt denies cv s/s. He prefers to use Nustep vs TM-Pt willing to change up modalities more to progress to STG of 4-4.5METs+ to LTG of 6-8METs.      Exercise Prescription  Exercise Mode: Treadmill;Bike;Nustep;Arm Erg.;Stairs;Hallway Walking (Utlize both continous and interval ex.)    Frequency: 2x/week    Duration: 30-60 minutes    Intensity / THR: 20-30 beats above resting heart rate    RPE 11-14  Progression / Met level: 2.8-4.5+ LTG of 6-8METs    Resistive Training?: Yes (2.5-5lbs weights at home)      Current Exercise (mins/week): 465      Interventions  Home Exercise:  Mode: walking     Frequency: non rehab days     Duration: 60 min      Education Material : Provide written material;Individual education and counseling      Education Completed  Exercise Education Completed: Cardiac Anatomy;Signs and Symptoms;Medication review;RPE;Emergency Plan;Home Exercise;Warm up/cool down;FITT Principles;BP/HR Reponse to exercise;Stretching;Strength training;Benefits of Exercise;End point of exercise              Exercise Follow-up/Discharge  Follow up/Discharge: Pt to discharge today. Pt has completed 13 sessions today. Pt has reached 2.7 METs on TM, 3.1 METs on Nustep and 2.8 METs on the arm erg. Pt continues to have PVCs with ex and denies sx/sx. Pt states he will continue HEP of walking 30-60' daily, and wts every other day. Pt states that he has noticed improvements due to cardiac rehah and would like to continue impoving at home once he is discharged    NUTRITION  Nutrition Assessment: Discharge      Nutrition Risk Factors:  Nutrition Risk Factors: Dyslipidemia;Overweight  Cholesterol: 220  LDL: 113  HDL: 38  Triglycerides: 541      Nutrition Plan  Interventions  Diet Consult: Completed    Other Nutrition Intervention: Diet Class;Therapist/Pt Discussion;Provide with Written Material    Initial Rate Your Plate Score: 59    Education Completed  Nutrition Education Completed:  "Low Saturated fat diet;Low sodium diet;Weight management;Risk factor overview      Goals  Nutrition Goals (Next 30 days): Patient will lose weight;Patient knows appropriate portion size      Goals Met  Nutrition Goals Met: Patient follows a low sodium diet;Patient states following a low saturated fat diet;Patient knows appropriate portion size;Reviewed Dietitian schedule;Provided Rate your Plate Survey;Completed Nutritional Risk Screen;Patient has lost weight      Height, Weight, and  BMI  Weight: 221 lb (100.2 kg)  Height: 5' 10\" (1.778 m)  BMI: 31.71      Nutrition Follow-up  Follow-up/Discharge: RD diet educ completed 12/1/2020. Pt continues to follow a low fat, low salt and low cholesterol 90% of the time. Pt continues to take Lipitor as prescribed. Education on lowering triglycerides completed. Pt feels comfortable with heart healthy diet at this time. He found meeting with dietician very helpful         Other Risk Factors  Other Risk Factor Assessment: Discharge      HTN Risk Factor: Hypertension      Pre Exercise BP: 108/64  Post Exercise BP: 110/62      Hypertension Plan  Goals  HTN Goals: Patient demonstrates understanding of HTN, no goals identified for the next 30 days      Goals Met  HTN Goals Met: Take medication as prescribed;Follow low sodium diet;Exercises regularly      HTN Interventions  HTN Interventions: Therapist/patient discussion;Provide written material;Diet consult      HTN Education Completed  HTN Education Completed: Low sodium diet;Medication review;Risk factor overview      Tobacco Risk Factor: NA    Risk Factor Follow-up   Follow-up/Discharge: CRF: FHx, Overweight, DLD, HTN and Stress     PSYCHOSOCIAL  Psychosocial Assessment: Discharge       [facility] Summary Score: 13      PHQ-9 Total Score: 2      Psychosocial Risk Factor: Stress      Psychosocial Plan  Interventions  Interventions: Provide written material;Individual education and counseling      Education " Completed  Education Completed: Relaxation/Coping Techniques;S/S of depression;Effects of stress on body      Goals      Goals Met  Goals Met: Identified Support system;Oriented to stress management classes;Identify stressors;Practicing stress management skills      Psychosocial Follow-up  Follow-up/Discharge: Pt continues to stress about wife's health.              Patient involved in Goal setting?: Yes      Signature: _____________________________________________________________    Date: __________________    Time: __________________

## 2021-06-16 PROBLEM — I21.3 STEMI (ST ELEVATION MYOCARDIAL INFARCTION) (H): Status: ACTIVE | Noted: 2020-10-08

## 2021-06-30 NOTE — PROGRESS NOTES
Progress Notes by Tammy Vinson CNP at 11/17/2020  9:10 AM     Author: Tammy Vinson CNP Service: -- Author Type: Nurse Practitioner    Filed: 11/17/2020 10:11 AM Encounter Date: 11/17/2020 Status: Signed    : Tammy Vinson CNP (Nurse Practitioner)             Assessment/Recommendations   1.  Coronary artery disease:  He was hospitalized October 8 to October 12 with a STEMI.  PCI on October 8, 2020 with drug-eluting stent to RPL.  He has a 50% lesion at ostial/proximal left circumflex.  He is on Plavix.  We discussed the importance of antiplatelet therapy and talking with his cardiologist prior to stopping these medications for any reason.  Puncture site is soft with no hematoma.      Risk factor modification and lifestyle management topics were discussed including managing comorbidities, heart healthy diet and exercise.  He will begin cardiac rehab later this week.  He is walking an hour daily and is asymptomatic.    2.  Dyslipidemia: Shimon Duran is on high intensity statin therapy with atorvastatin 80 mg daily.  His LDL is 113.  We discussed a diet low in saturated fat, weight loss, and exercise along with medication for better control of cholesterol.     3.  Hypertension: His blood pressure is 130/76.  Home blood pressures are typically in the 120s/70s.    4.  Atrial fibrillation: He reports home heart rates from 48-80s bpm.  At least half of his heart rates are in the 50s.  He was told to hold sotalol if heart rate in the 50s.  He is asymptomatic with lower heart rates.  Heart rate is regular today.  He continues to take Eliquis.  I will discuss sotalol dosing with Dr. Cannon.      Shimon Duran will follow up with Dr. Cannon on December 4.       History of Present Illness/Subjective    Shimon Duran is seen at [facility] for post coronary intervention follow up.  He has a history of hypertension and dyslipidemia.  He was hospitalized October 8 to October  12 with a STEMI.  PCI on October 8, 2020 with drug-eluting stent to RPL.  He has a 50% lesion at ostial/proximal left circumflex.  He was also diagnosed with new paroxysmal atrial fibrillation and started on sotalol and Eliquis.  Echocardiogram on October 9, 2020 showed ejection fraction of 55 to 60%.    He had 1 episode of brief chest pain 4 days after discharge.  He took aspirin but did not take nitro.  Pain went away after a few minutes.  He denies any shortness of breath.  He is walking for an hour daily and is asymptomatic.  He will start cardiac rehab later this week.  He denies fatigue, lightheadedness, shortness of breath, dyspnea on exertion and lower extremity edema.      Echo 10/9/2020:  1. Normal left ventricular size and systolic performance with a visually estimated ejection fraction of 55-60%.   2. There is moderate to severe basal inferior hypokinesis.  3. No significant valvular heart disease is identified on this study.   4. Normal right ventricular size and systolic performance.       Results for orders placed during the hospital encounter of 10/08/20   Cardiac Catheterization [CATH01] 10/08/2020    Narrative   1st RPL lesion is 99% stenosed.    The LM vessel was large and is considered normal.    Estimated blood loss was <20 ml.    The LAD vessel was moderate .    A drug eluting stent was successfully placed.    Ost Cx to Prox Cx lesion is 50% stenosed.     74 yo male with hx of HTN and presents with chest pain and ST elevatoin MI    Angiography via left radial   LM normal  LAD mild dz  Circ ostial 50%  RCA in PL branch 99% KATELYN 3 flow    PCI: PTCA/BO to PL     Imp/plan  1. Acute ST inferior wall MI s/p PCI - asp/ticagrelor for 9 months, high   dose statin, BP control, echo in AM, cardiac rehab, follow up with NP in   1-2 weeks and if symptoms would stress nuclear imaging given residual 50%   ostia Circ.  Follow up with myself in 10-12 weeks.           Physical Examination Review of Systems    Vitals:    11/17/20 0921   BP: 130/76   Pulse: (!) 50   SpO2: 98%     Body mass index is 32.14 kg/m .  Wt Readings from Last 3 Encounters:   11/17/20 (!) 224 lb (101.6 kg)   10/12/20 (!) 227 lb 8 oz (103.2 kg)   10/08/20 (!) 245 lb (111.1 kg)       General Appearance:     Alert, cooperative and in no acute distress.   ENT/Mouth: membranes moist, no oral lesions or bleeding gums.      EYES:  no scleral icterus, normal conjunctivae   Chest/Lungs:   lungs are clear to auscultation, no rales or wheezing, respirations unlabored   Cardiovascular:   Regular, richard 50 bpm. Normal first and second heart sounds, no edema bilateral lower extremities    Abdomen:  Soft, nontender, nondistended, bowel sounds present   Extremities: no cyanosis or clubbing   Skin:  Neurologic: warm, dry.  mood and affect are appropriate, alert and oriented x3     Puncture Site: Left radial site is soft with no bruising.  Radial pulses and Pedal pulses intact and symmetrical.  CMS intact.        General: WNL  Eyes: WNL  Ears/Nose/Throat: WNL  Lungs: WNL  Heart: Chest Pain  Stomach: WNL  Bladder: WNL  Muscle/Joints: WNL  Skin: WNL  Nervous System: WNL  Mental Health: WNL     Blood: WNL     Medical History  Surgical History Family History Social History   Past Medical History:   Diagnosis Date   ? Hyperlipidemia    ? Hypertension     Past Surgical History:   Procedure Laterality Date   ? CHOLECYSTECTOMY     ? CV CORONARY ANGIOGRAM N/A 10/8/2020    Procedure: Coronary Angiogram;  Surgeon: Alvin Cannon MD;  Location: [facility];  Service: Cardiology    Family History   Problem Relation Age of Onset   ? Coronary artery disease Father     Social History     Socioeconomic History   ? Marital status:      Spouse name: Not on file   ? Number of children: Not on file   ? Years of education: Not on file   ? Highest education level: Not on file   Occupational History   ? Not on file   Social Needs   ? Financial resource strain: Not  on file   ? Food insecurity     Worry: Not on file     Inability: Not on file   ? Transportation needs     Medical: Not on file     Non-medical: Not on file   Tobacco Use   ? Smoking status: Never Smoker   ? Smokeless tobacco: Never Used   Substance and Sexual Activity   ? Alcohol use: Not Currently   ? Drug use: Never   ? Sexual activity: Not on file   Lifestyle   ? Physical activity     Days per week: Not on file     Minutes per session: Not on file   ? Stress: Not on file   Relationships   ? Social connections     Talks on phone: Not on file     Gets together: Not on file     Attends Zoroastrianism service: Not on file     Active member of club or organization: Not on file     Attends meetings of clubs or organizations: Not on file     Relationship status: Not on file   ? Intimate partner violence     Fear of current or ex partner: Not on file     Emotionally abused: Not on file     Physically abused: Not on file     Forced sexual activity: Not on file   Other Topics Concern   ? Not on file   Social History Narrative   ? Not on file          Medications  Allergies   Current Outpatient Medications   Medication Sig Dispense Refill   ? acetaminophen (TYLENOL) 500 MG tablet Take 500 mg by mouth every 6 (six) hours as needed for pain.     ? amLODIPine-benazepril (LOTREL) 10-40 mg per capsule Take 1 capsule by mouth daily.     ? apixaban ANTICOAGULANT (ELIQUIS) 5 mg Tab tablet Take 1 tablet (5 mg total) by mouth 2 (two) times a day. 60 tablet 0   ? atorvastatin (LIPITOR) 80 MG tablet Take 1 tablet (80 mg total) by mouth daily. 90 tablet 3   ? clopidogreL (PLAVIX) 75 mg tablet Take 1 tablet (75 mg total) by mouth daily. 30 tablet 0   ? clotrimazole (LOTRIMIN) 1 % cream Apply 1 application topically 2 (two) times a day. Apply to underarms     ? nitroglycerin (NITROSTAT) 0.4 MG SL tablet Place 1 tablet (0.4 mg total) under the tongue every 5 (five) minutes as needed for chest pain. 1 Bottle 0   ? omeprazole (PRILOSEC) 20 MG  capsule Take 2 capsules (40 mg total) by mouth daily before breakfast. 30 capsule 0   ? sotaloL (BETAPACE) 80 MG tablet Take 1 tablet (80 mg total) by mouth every 12 (twelve) hours. 60 tablet 0     No current facility-administered medications for this visit.     Allergies   Allergen Reactions   ? Other Drug Allergy (See Comments)      Pt does not recall the med, caused nausea.         Lab Results    Chemistry/lipid CBC Cardiac Enzymes/BNP/TSH/INR   Lab Results   Component Value Date    CHOL 220 (H) 10/08/2020    HDL 38 (L) 10/08/2020    LDLCALC  10/08/2020      Comment:      Invalid, Triglycerides >400    TRIG 541 (H) 10/08/2020    CREATININE 0.75 10/09/2020    BUN 16 10/09/2020    K 4.0 10/09/2020     10/09/2020     (H) 10/09/2020    CO2 21 (L) 10/09/2020    Lab Results   Component Value Date    WBC 10.8 10/08/2020    HGB 13.2 (L) 10/09/2020    HCT 40.8 10/08/2020    MCV 92 10/08/2020     10/08/2020    Lab Results   Component Value Date    TROPONINI 0.77 (HH) 10/08/2020    TSH 3.06 10/11/2020    INR 1.09 10/08/2020

## 2021-06-30 NOTE — PROGRESS NOTES
"Progress Notes by Alvin Cannon MD at 12/4/2020 10:30 AM     Author: Alvin Cannon MD Service: -- Author Type: Physician    Filed: 12/4/2020 11:24 AM Encounter Date: 12/4/2020 Status: Signed    : Alvin Cannon MD (Physician)         Thank you, Dr. Adkins, for asking the [facility] Heart Care team to see Mr. Shimon Duran to evaluate       Assessment/Recommendations   Assessment/Plan:  1. CAD s/p  MI with PCI to RCA - on atorvastatin/plavix, will cont plavix for total 9 mo then stop due to need for eliquis then start 81mg aspirin  2. afib - on sotalol and eliquis, score 3, we discussed therapy and options and he understands.  If HR ranges 30-50 would lower sotalol to 40mg two times a day, but given no othostatic symptoms would cont at current dose.      Follow up in 1 year     History of Present Illness/Subjective    Mr. Shimon Duran is a 75 y.o. male with PCI fo RCA for AMI and afib, with residual 50% in Circ, no chest pain/pressure, HTN on medication, no GI/ bleedin, no PND, orthopnea, random sharp pain that resolved.  At night he sometimes feels deep heart beat at night.  No palpitaitons other than \"deep heart beat\" with BP 140s and HR 50's.  Noted PVC with exercise but no loss of exercise tolerance. ECG sinus richard with QTc 408. No dizzy spells and able to go up stairs.  Echo with normal EF 55% with WMA inf wall.        Physical Examination Review of Systems   Vitals:    12/04/20 1025   BP: 124/76   Pulse: (!) 48   Resp: 16   SpO2: 98%     Body mass index is 32.03 kg/m .  Wt Readings from Last 3 Encounters:   12/04/20 (!) 223 lb 4 oz (101.3 kg)   12/03/20 222 lb (100.7 kg)   12/01/20 221 lb (100.2 kg)     [unfilled]  General Appearance:   no distress, normal body habitus   ENT/Mouth: membranes moist, no oral lesions or bleeding gums.      EYES:  no scleral icterus, normal conjunctivae   Neck: no carotid bruits or thyromegaly   Chest/Lungs:   lungs are clear to " auscultation, no rales or wheezing,  sternal scar, equal chest wall expansion    Cardiovascular:   Regular. Normal first and second heart sounds with no murmurs, rubs, or gallops; the carotid, radial and posterior tibial pulses are intact, Jugular venous pressure , edema bilaterally    Abdomen:  no organomegaly, masses, bruits, or tenderness; bowel sounds are present   Extremities: no cyanosis or clubbing   Skin: no xanthelasma, warm.    Neurologic: normal  bilateral, no tremors     Psychiatric: alert and oriented x3, calm     Review of Systems - 12 points nega other than above      Medical History  Surgical History Family History Social History   Past Medical History:   Diagnosis Date   ? Hyperlipidemia    ? Hypertension     Past Surgical History:   Procedure Laterality Date   ? CHOLECYSTECTOMY     ? CV CORONARY ANGIOGRAM N/A 10/8/2020    Procedure: Coronary Angiogram;  Surgeon: Alvin Cannon MD;  Location: [facility] Lab;  Service: Cardiology    Family History   Problem Relation Age of Onset   ? Coronary artery disease Father     Social History     Socioeconomic History   ? Marital status:      Spouse name: Not on file   ? Number of children: Not on file   ? Years of education: Not on file   ? Highest education level: Not on file   Occupational History   ? Not on file   Social Needs   ? Financial resource strain: Not on file   ? Food insecurity     Worry: Not on file     Inability: Not on file   ? Transportation needs     Medical: Not on file     Non-medical: Not on file   Tobacco Use   ? Smoking status: Never Smoker   ? Smokeless tobacco: Never Used   Substance and Sexual Activity   ? Alcohol use: Not Currently   ? Drug use: Never   ? Sexual activity: Not on file   Lifestyle   ? Physical activity     Days per week: Not on file     Minutes per session: Not on file   ? Stress: Not on file   Relationships   ? Social connections     Talks on phone: Not on file     Gets together: Not on file      Attends Buddhism service: Not on file     Active member of club or organization: Not on file     Attends meetings of clubs or organizations: Not on file     Relationship status: Not on file   ? Intimate partner violence     Fear of current or ex partner: Not on file     Emotionally abused: Not on file     Physically abused: Not on file     Forced sexual activity: Not on file   Other Topics Concern   ? Not on file   Social History Narrative   ? Not on file          Medications  Allergies   Scheduled Meds:  Continuous Infusions:  PRN Meds:. Allergies   Allergen Reactions   ? Other Drug Allergy (See Comments)      Pt does not recall the med, caused nausea.         Lab Results    Chemistry/lipid CBC Cardiac Enzymes/BNP/TSH/INR   Lab Results   Component Value Date    CHOL 220 (H) 10/08/2020    HDL 38 (L) 10/08/2020    LDLCALC  10/08/2020      Comment:      Invalid, Triglycerides >400    TRIG 541 (H) 10/08/2020    CREATININE 0.75 10/09/2020    BUN 16 10/09/2020    K 4.0 10/09/2020     10/09/2020     (H) 10/09/2020    CO2 21 (L) 10/09/2020    Lab Results   Component Value Date    WBC 10.8 10/08/2020    HGB 13.2 (L) 10/09/2020    HCT 40.8 10/08/2020    MCV 92 10/08/2020     10/08/2020    Lab Results   Component Value Date    TROPONINI 0.77 (HH) 10/08/2020    TSH 3.06 10/11/2020    INR 1.09 10/08/2020              Alvin Cannon MD  Interventional Cardiology  [facility]

## 2021-12-08 ENCOUNTER — OFFICE VISIT (OUTPATIENT)
Dept: CARDIOLOGY | Facility: CLINIC | Age: 76
End: 2021-12-08
Payer: COMMERCIAL

## 2021-12-08 VITALS
HEIGHT: 70 IN | BODY MASS INDEX: 31.47 KG/M2 | SYSTOLIC BLOOD PRESSURE: 130 MMHG | WEIGHT: 219.8 LBS | HEART RATE: 64 BPM | RESPIRATION RATE: 16 BRPM | DIASTOLIC BLOOD PRESSURE: 72 MMHG

## 2021-12-08 DIAGNOSIS — I48.0 PAROXYSMAL ATRIAL FIBRILLATION (H): Primary | ICD-10-CM

## 2021-12-08 DIAGNOSIS — E78.5 HYPERLIPIDEMIA LDL GOAL <70: ICD-10-CM

## 2021-12-08 DIAGNOSIS — Z95.5 S/P CORONARY ARTERY STENT PLACEMENT: ICD-10-CM

## 2021-12-08 DIAGNOSIS — I48.91 NEW ONSET ATRIAL FIBRILLATION (H): ICD-10-CM

## 2021-12-08 DIAGNOSIS — I21.11 ST ELEVATION MYOCARDIAL INFARCTION INVOLVING RIGHT CORONARY ARTERY (H): ICD-10-CM

## 2021-12-08 PROCEDURE — 99214 OFFICE O/P EST MOD 30 MIN: CPT | Performed by: INTERNAL MEDICINE

## 2021-12-08 PROCEDURE — 93000 ELECTROCARDIOGRAM COMPLETE: CPT | Performed by: GENERAL ACUTE CARE HOSPITAL

## 2021-12-08 RX ORDER — SOTALOL HYDROCHLORIDE 80 MG/1
40 TABLET ORAL 2 TIMES DAILY
Qty: 90 TABLET | Refills: 11 | Status: SHIPPED | OUTPATIENT
Start: 2021-12-08 | End: 2023-02-10

## 2021-12-08 RX ORDER — MULTIVIT-MIN/IRON/FOLIC ACID/K 18-600-40
4000 CAPSULE ORAL DAILY
COMMUNITY

## 2021-12-08 RX ORDER — ATORVASTATIN CALCIUM 80 MG/1
80 TABLET, FILM COATED ORAL DAILY
Qty: 90 TABLET | Refills: 11 | Status: SHIPPED | OUTPATIENT
Start: 2021-12-08 | End: 2023-01-19

## 2021-12-08 RX ORDER — MULTIVITAMIN,THERAPEUTIC
1 TABLET ORAL DAILY
COMMUNITY

## 2021-12-08 ASSESSMENT — MIFFLIN-ST. JEOR: SCORE: 1743.26

## 2021-12-08 NOTE — PATIENT INSTRUCTIONS
Sofi  [phone]**  [phone]    Stop clopidogrel and start asp 81mg daily    Lower sotalol to 40mg twice a day    Obtain blood tests fasting in next 1-2 months - check lipids, red cell count and kidney function    Consider wt loss to help with atrial fibrillation

## 2021-12-08 NOTE — PROGRESS NOTES
"  Thank you, Dr. Cannon, for asking the [facility] team to see Mr. Shimon Duran to evaluate       Assessment/Recommendations   Assessment/Plan:  1. afib - paroxysmal - lower sotalol 40mg bid, cont eliquis, discussed wt loss to prevent afib (clinical trial)  2. CAD s/p MI with PCI - check lipids, stop clopidogrel and start asp 81mg daily, cont atorvastatin 80mg, check BMP/cbc  3. HTN well controlled cont amloidpine/ACE     Follow up in one year     History of Present Illness/Subjective    Mr. Shimon Duran is a 76 year old male with hx fof afib and MI s/p PCI to PL branch 10/20 here for follow up, no chest pain/pressure, no dyspnea, on med for HTN, no DM, no tob, no PAOLA, no GI/ bleeding, edema, syncopal events, palpitaiotns, BP written down with range 110 to 145.   ECG sinus richard           Physical Examination Review of Systems   /72 (BP Location: Left arm, Patient Position: Sitting, Cuff Size: Adult Large)   Pulse 64   Resp 16   Ht 1.778 m (5' 10\")   Wt 99.7 kg (219 lb 12.8 oz)   BMI 31.54 kg/m    Body mass index is 31.54 kg/m .  Wt Readings from Last 3 Encounters:   12/08/21 99.7 kg (219 lb 12.8 oz)   12/31/20 100.2 kg (221 lb)   12/22/20 98.9 kg (218 lb)     [unfilled]  General Appearance:   no distress, normal body habitus   ENT/Mouth: membranes moist, no oral lesions or bleeding gums.      EYES:  no scleral icterus, normal conjunctivae   Neck: no carotid bruits or thyromegaly   Chest/Lungs:   lungs are clear to auscultation, no rales or wheezing,  sternal scar, equal chest wall expansion    Cardiovascular:   Regular. Normal first and second heart sounds with no murmurs, rubs, or gallops; the carotid, radial and posterior tibial pulses are intact, Jugular venous pressure , edema bilaterally    Abdomen:  no organomegaly, masses, bruits, or tenderness; bowel sounds are present   Extremities: no cyanosis or clubbing   Skin: no xanthelasma, warm.    Neurologic: normal  " bilateral, no tremors     Psychiatric: alert and oriented x3, calm     Review of Systems - 12 points nega other than above      Medical History  Surgical History Family History Social History   Past Medical History:   Diagnosis Date     Hyperlipidemia      Hypertension     Past Surgical History:   Procedure Laterality Date     CHOLECYSTECTOMY       CV CORONARY ANGIOGRAM N/A 10/8/2020    Procedure: Coronary Angiogram;  Surgeon: Alvin Cannon MD;  Location: [facility];  Service: Cardiology    Family History   Problem Relation Age of Onset     Coronary Artery Disease Father     Social History     Socioeconomic History     Marital status:      Spouse name: Not on file     Number of children: Not on file     Years of education: Not on file     Highest education level: Not on file   Occupational History     Not on file   Tobacco Use     Smoking status: Never Smoker     Smokeless tobacco: Never Used   Substance and Sexual Activity     Alcohol use: Not Currently     Drug use: Never     Sexual activity: Not on file   Other Topics Concern     Not on file   Social History Narrative     Not on file     Social Determinants of Health     Financial Resource Strain: Not on file   Food Insecurity: Not on file   Transportation Needs: Not on file   Physical Activity: Not on file   Stress: Not on file   Social Connections: Not on file   Intimate Partner Violence: Not on file   Housing Stability: Not on file          Medications  Allergies   Scheduled Meds:  Continuous Infusions:  PRN Meds:. Allergies   Allergen Reactions     Other Drug Allergy (See Comments) Unknown     Pt does not recall the med, caused nausea.         Lab Results    Chemistry/lipid CBC Cardiac Enzymes/BNP/TSH/INR   Lab Results   Component Value Date    CHOL 124 03/30/2021    HDL 36 (L) 03/30/2021    TRIG 177 (H) 03/30/2021    BUN 13 03/30/2021     03/30/2021    CO2 22 03/30/2021    Lab Results   Component Value Date    WBC 10.8  10/08/2020    HGB 13.2 (L) 10/09/2020    HCT 40.8 10/08/2020    MCV 92 10/08/2020     10/08/2020    Lab Results   Component Value Date    TROPONINI 0.77 (HH) 10/08/2020    TSH 3.06 10/11/2020    INR 1.09 10/08/2020              Alvin Cannon MD  Interventional Cardiology  [facility]

## 2021-12-08 NOTE — LETTER
"12/8/2021    Mode Adkins MD  [facility] [address] [place] [address]    RE: Shimon Duran       Dear Colleague,     I had the pleasure of seeing Shimon Duran in the [facility].    Thank you, Dr. Cannon, for asking the [facility] team to see Mr. Shimon Duran to evaluate       Assessment/Recommendations   Assessment/Plan:  1. afib - paroxysmal - lower sotalol 40mg bid, cont eliquis, discussed wt loss to prevent afib (clinical trial)  2. CAD s/p MI with PCI - check lipids, stop clopidogrel and start asp 81mg daily, cont atorvastatin 80mg, check BMP/cbc  3. HTN well controlled cont amloidpine/ACE     Follow up in one year     History of Present Illness/Subjective    Mr. Sihmon Duran is a 76 year old male with hx fof afib and MI s/p PCI to PL branch 10/20 here for follow up, no chest pain/pressure, no dyspnea, on med for HTN, no DM, no tob, no PAOLA, no GI/ bleeding, edema, syncopal events, palpitaiotns, BP written down with range 110 to 145.   ECG sinus richard           Physical Examination Review of Systems   /72 (BP Location: Left arm, Patient Position: Sitting, Cuff Size: Adult Large)   Pulse 64   Resp 16   Ht 1.778 m (5' 10\")   Wt 99.7 kg (219 lb 12.8 oz)   BMI 31.54 kg/m    Body mass index is 31.54 kg/m .  Wt Readings from Last 3 Encounters:   12/08/21 99.7 kg (219 lb 12.8 oz)   12/31/20 100.2 kg (221 lb)   12/22/20 98.9 kg (218 lb)     [unfilled]  General Appearance:   no distress, normal body habitus   ENT/Mouth: membranes moist, no oral lesions or bleeding gums.      EYES:  no scleral icterus, normal conjunctivae   Neck: no carotid bruits or thyromegaly   Chest/Lungs:   lungs are clear to auscultation, no rales or wheezing,  sternal scar, equal chest wall expansion    Cardiovascular:   Regular. Normal first and second heart sounds with no murmurs, rubs, or gallops; the carotid, radial and posterior tibial pulses are intact, Jugular venous " pressure , edema bilaterally    Abdomen:  no organomegaly, masses, bruits, or tenderness; bowel sounds are present   Extremities: no cyanosis or clubbing   Skin: no xanthelasma, warm.    Neurologic: normal  bilateral, no tremors     Psychiatric: alert and oriented x3, calm     Review of Systems - 12 points nega other than above      Medical History  Surgical History Family History Social History   Past Medical History:   Diagnosis Date     Hyperlipidemia      Hypertension     Past Surgical History:   Procedure Laterality Date     CHOLECYSTECTOMY       CV CORONARY ANGIOGRAM N/A 10/8/2020    Procedure: Coronary Angiogram;  Surgeon: Alvin Cannon MD;  Location: [facility];  Service: Cardiology    Family History   Problem Relation Age of Onset     Coronary Artery Disease Father     Social History     Socioeconomic History     Marital status:      Spouse name: Not on file     Number of children: Not on file     Years of education: Not on file     Highest education level: Not on file   Occupational History     Not on file   Tobacco Use     Smoking status: Never Smoker     Smokeless tobacco: Never Used   Substance and Sexual Activity     Alcohol use: Not Currently     Drug use: Never     Sexual activity: Not on file   Other Topics Concern     Not on file   Social History Narrative     Not on file     Social Determinants of Health     Financial Resource Strain: Not on file   Food Insecurity: Not on file   Transportation Needs: Not on file   Physical Activity: Not on file   Stress: Not on file   Social Connections: Not on file   Intimate Partner Violence: Not on file   Housing Stability: Not on file          Medications  Allergies   Scheduled Meds:  Continuous Infusions:  PRN Meds:. Allergies   Allergen Reactions     Other Drug Allergy (See Comments) Unknown     Pt does not recall the med, caused nausea.         Lab Results    Chemistry/lipid CBC Cardiac Enzymes/BNP/TSH/INR   Lab Results    Component Value Date    CHOL 124 03/30/2021    HDL 36 (L) 03/30/2021    TRIG 177 (H) 03/30/2021    BUN 13 03/30/2021     03/30/2021    CO2 22 03/30/2021    Lab Results   Component Value Date    WBC 10.8 10/08/2020    HGB 13.2 (L) 10/09/2020    HCT 40.8 10/08/2020    MCV 92 10/08/2020     10/08/2020    Lab Results   Component Value Date    TROPONINI 0.77 (HH) 10/08/2020    TSH 3.06 10/11/2020    INR 1.09 10/08/2020            Alvin Cannon MD  Interventional Cardiology  [facility]

## 2021-12-10 LAB
ATRIAL RATE - MUSE: 48 BPM
DIASTOLIC BLOOD PRESSURE - MUSE: NORMAL MMHG
INTERPRETATION ECG - MUSE: NORMAL
P AXIS - MUSE: 30 DEGREES
PR INTERVAL - MUSE: 148 MS
QRS DURATION - MUSE: 74 MS
QT - MUSE: 442 MS
QTC - MUSE: 394 MS
R AXIS - MUSE: -23 DEGREES
SYSTOLIC BLOOD PRESSURE - MUSE: NORMAL MMHG
T AXIS - MUSE: 3 DEGREES
VENTRICULAR RATE- MUSE: 48 BPM

## 2022-01-11 ENCOUNTER — LAB (OUTPATIENT)
Dept: CARDIOLOGY | Facility: CLINIC | Age: 77
End: 2022-01-11
Payer: COMMERCIAL

## 2022-01-11 DIAGNOSIS — I48.0 PAROXYSMAL ATRIAL FIBRILLATION (H): ICD-10-CM

## 2022-01-11 DIAGNOSIS — E78.5 HYPERLIPIDEMIA LDL GOAL <70: ICD-10-CM

## 2022-01-11 LAB
ANION GAP SERPL CALCULATED.3IONS-SCNC: 12 MMOL/L (ref 5–18)
BUN SERPL-MCNC: 11 MG/DL (ref 8–28)
CALCIUM SERPL-MCNC: 9.1 MG/DL (ref 8.5–10.5)
CHLORIDE BLD-SCNC: 104 MMOL/L (ref 98–107)
CHOLEST SERPL-MCNC: 135 MG/DL
CO2 SERPL-SCNC: 25 MMOL/L (ref 22–31)
CREAT SERPL-MCNC: 0.82 MG/DL (ref 0.7–1.3)
ERYTHROCYTE [DISTWIDTH] IN BLOOD BY AUTOMATED COUNT: 12 % (ref 10–15)
FASTING STATUS PATIENT QL REPORTED: YES
GFR SERPL CREATININE-BSD FRML MDRD: >90 ML/MIN/1.73M2
GLUCOSE BLD-MCNC: 125 MG/DL (ref 70–125)
HCT VFR BLD AUTO: 41.2 % (ref 40–53)
HDLC SERPL-MCNC: 40 MG/DL
HGB BLD-MCNC: 14.1 G/DL (ref 13.3–17.7)
LDLC SERPL CALC-MCNC: 60 MG/DL
MCH RBC QN AUTO: 31.3 PG (ref 26.5–33)
MCHC RBC AUTO-ENTMCNC: 34.2 G/DL (ref 31.5–36.5)
MCV RBC AUTO: 92 FL (ref 78–100)
PLATELET # BLD AUTO: 261 10E3/UL (ref 150–450)
POTASSIUM BLD-SCNC: 4.1 MMOL/L (ref 3.5–5)
RBC # BLD AUTO: 4.5 10E6/UL (ref 4.4–5.9)
SODIUM SERPL-SCNC: 141 MMOL/L (ref 136–145)
TRIGL SERPL-MCNC: 175 MG/DL
WBC # BLD AUTO: 7.2 10E3/UL (ref 4–11)

## 2022-01-11 PROCEDURE — 85027 COMPLETE CBC AUTOMATED: CPT

## 2022-01-11 PROCEDURE — 80061 LIPID PANEL: CPT

## 2022-01-11 PROCEDURE — 36415 COLL VENOUS BLD VENIPUNCTURE: CPT

## 2022-01-11 PROCEDURE — 80048 BASIC METABOLIC PNL TOTAL CA: CPT

## 2022-03-30 ENCOUNTER — LAB REQUISITION (OUTPATIENT)
Dept: LAB | Facility: CLINIC | Age: 77
End: 2022-03-30

## 2022-03-30 DIAGNOSIS — E78.5 HYPERLIPIDEMIA, UNSPECIFIED: ICD-10-CM

## 2022-03-30 DIAGNOSIS — I10 ESSENTIAL (PRIMARY) HYPERTENSION: ICD-10-CM

## 2022-03-30 DIAGNOSIS — I25.10 ATHEROSCLEROTIC HEART DISEASE OF NATIVE CORONARY ARTERY WITHOUT ANGINA PECTORIS: ICD-10-CM

## 2022-03-30 LAB
ALBUMIN SERPL-MCNC: 3.8 G/DL (ref 3.5–5)
ALP SERPL-CCNC: 53 U/L (ref 45–120)
ALT SERPL W P-5'-P-CCNC: 19 U/L (ref 0–45)
ANION GAP SERPL CALCULATED.3IONS-SCNC: 14 MMOL/L (ref 5–18)
AST SERPL W P-5'-P-CCNC: 25 U/L (ref 0–40)
BILIRUB SERPL-MCNC: 0.9 MG/DL (ref 0–1)
BUN SERPL-MCNC: 16 MG/DL (ref 8–28)
CALCIUM SERPL-MCNC: 9.2 MG/DL (ref 8.5–10.5)
CHLORIDE BLD-SCNC: 107 MMOL/L (ref 98–107)
CHOLEST SERPL-MCNC: 136 MG/DL
CO2 SERPL-SCNC: 17 MMOL/L (ref 22–31)
CREAT SERPL-MCNC: 0.79 MG/DL (ref 0.7–1.3)
FASTING STATUS PATIENT QL REPORTED: ABNORMAL
GFR SERPL CREATININE-BSD FRML MDRD: >90 ML/MIN/1.73M2
GLUCOSE BLD-MCNC: 130 MG/DL (ref 70–125)
HDLC SERPL-MCNC: 36 MG/DL
LDLC SERPL CALC-MCNC: 56 MG/DL
POTASSIUM BLD-SCNC: 4.4 MMOL/L (ref 3.5–5)
PROT SERPL-MCNC: 7.7 G/DL (ref 6–8)
SODIUM SERPL-SCNC: 138 MMOL/L (ref 136–145)
TRIGL SERPL-MCNC: 220 MG/DL

## 2022-03-30 PROCEDURE — 80061 LIPID PANEL: CPT | Performed by: FAMILY MEDICINE

## 2022-03-30 PROCEDURE — 80053 COMPREHEN METABOLIC PANEL: CPT | Performed by: FAMILY MEDICINE

## 2023-01-05 ENCOUNTER — TRANSFERRED RECORDS (OUTPATIENT)
Dept: HEALTH INFORMATION MANAGEMENT | Facility: CLINIC | Age: 78
End: 2023-01-05

## 2023-01-06 ENCOUNTER — MEDICAL CORRESPONDENCE (OUTPATIENT)
Dept: HEALTH INFORMATION MANAGEMENT | Facility: CLINIC | Age: 78
End: 2023-01-06

## 2023-01-19 DIAGNOSIS — I21.11 ST ELEVATION MYOCARDIAL INFARCTION INVOLVING RIGHT CORONARY ARTERY (H): ICD-10-CM

## 2023-01-19 RX ORDER — ATORVASTATIN CALCIUM 80 MG/1
TABLET, FILM COATED ORAL
Qty: 90 TABLET | Refills: 0 | Status: SHIPPED | OUTPATIENT
Start: 2023-01-19 | End: 2023-04-25

## 2023-02-10 ENCOUNTER — OFFICE VISIT (OUTPATIENT)
Dept: CARDIOLOGY | Facility: CLINIC | Age: 78
End: 2023-02-10
Payer: COMMERCIAL

## 2023-02-10 VITALS
HEART RATE: 85 BPM | DIASTOLIC BLOOD PRESSURE: 68 MMHG | WEIGHT: 201 LBS | HEIGHT: 70 IN | BODY MASS INDEX: 28.77 KG/M2 | RESPIRATION RATE: 16 BRPM | SYSTOLIC BLOOD PRESSURE: 102 MMHG

## 2023-02-10 DIAGNOSIS — I25.10 CORONARY ARTERY DISEASE INVOLVING NATIVE CORONARY ARTERY OF NATIVE HEART WITHOUT ANGINA PECTORIS: ICD-10-CM

## 2023-02-10 DIAGNOSIS — E78.5 HYPERLIPIDEMIA LDL GOAL <70: ICD-10-CM

## 2023-02-10 DIAGNOSIS — I48.0 PAROXYSMAL ATRIAL FIBRILLATION (H): Primary | ICD-10-CM

## 2023-02-10 LAB
ATRIAL RATE - MUSE: 340 BPM
DIASTOLIC BLOOD PRESSURE - MUSE: NORMAL MMHG
INTERPRETATION ECG - MUSE: NORMAL
P AXIS - MUSE: NORMAL DEGREES
PR INTERVAL - MUSE: NORMAL MS
QRS DURATION - MUSE: 76 MS
QT - MUSE: 382 MS
QTC - MUSE: 426 MS
R AXIS - MUSE: -28 DEGREES
SYSTOLIC BLOOD PRESSURE - MUSE: NORMAL MMHG
T AXIS - MUSE: 22 DEGREES
VENTRICULAR RATE- MUSE: 75 BPM

## 2023-02-10 PROCEDURE — 93000 ELECTROCARDIOGRAM COMPLETE: CPT | Performed by: GENERAL ACUTE CARE HOSPITAL

## 2023-02-10 PROCEDURE — 99214 OFFICE O/P EST MOD 30 MIN: CPT | Performed by: INTERNAL MEDICINE

## 2023-02-10 RX ORDER — METOPROLOL SUCCINATE 25 MG/1
25 TABLET, EXTENDED RELEASE ORAL AT BEDTIME
Qty: 90 TABLET | Refills: 11 | Status: SHIPPED | OUTPATIENT
Start: 2023-02-10 | End: 2024-04-05

## 2023-02-10 NOTE — LETTER
"2/10/2023    Mode Adkins MD  [address] [place] [address]    RE: Shimon Duran       Dear Colleague,     I had the pleasure of seeing Shimon Duran in the [facility].    Thank you, Dr. Zhao ref. provider found, for asking the [facility] team to see Mr. Shimon Duran to evaluate       Assessment/Recommendations   Assessment/Plan:  1. CAD s/pPCI - cont atorvastatin, stop asp given no event and on eliquis, check fasting lipids  2. afib - on sotalol and eliquis, check CBC/CMP, check QT on ECG today, stop sotalol and change to metoprolol succiante 25mg daily and track heart rate call next week and adjust based on HR.    3. HTN - on amlodipine/benazapril     Follow up in one year     History of Present Illness/Subjective    Mr. Shimon Duran is a 77 year old male with CAD s/p PCI 10/2020, afib, HTN doing well, no chest pain/pressure, dyspnea, PND/orthopnea, syncopal events, edema, GI/ bleeding.  Would liek tost op some medication.  He lost 30 lbs intentionally and feels great.           Physical Examination Review of Systems   /68 (BP Location: Right arm, Patient Position: Sitting, Cuff Size: Adult Large)   Pulse 85   Resp 16   Ht 1.778 m (5' 10\")   Wt 91.2 kg (201 lb)   BMI 28.84 kg/m    Body mass index is 28.84 kg/m .  Wt Readings from Last 3 Encounters:   02/10/23 91.2 kg (201 lb)   12/08/21 99.7 kg (219 lb 12.8 oz)   12/31/20 100.2 kg (221 lb)     [unfilled]  General Appearance:   no distress, normal body habitus   ENT/Mouth: membranes moist, no oral lesions or bleeding gums.      EYES:  no scleral icterus, normal conjunctivae   Neck: no carotid bruits or thyromegaly   Chest/Lungs:   lungs are clear to auscultation, no rales or wheezing,  sternal scar, equal chest wall expansion    Cardiovascular:   Regular. Normal first and second heart sounds with no murmurs, rubs, or gallops; the carotid, radial and posterior tibial pulses are intact, Jugular venous pressure " , edema bilaterally    Abdomen:  no organomegaly, masses, bruits, or tenderness; bowel sounds are present   Extremities: no cyanosis or clubbing   Skin: no xanthelasma, warm.    Neurologic: normal  bilateral, no tremors     Psychiatric: alert and oriented x3, calm     Review of Systems - 12 points nega other than above      Medical History  Surgical History Family History Social History   Past Medical History:   Diagnosis Date     Hyperlipidemia      Hypertension     Past Surgical History:   Procedure Laterality Date     CHOLECYSTECTOMY       CV CORONARY ANGIOGRAM N/A 10/8/2020    Procedure: Coronary Angiogram;  Surgeon: Alvin Cannon MD;  Location: [facility];  Service: Cardiology    Family History   Problem Relation Age of Onset     Coronary Artery Disease Father     Social History     Socioeconomic History     Marital status:      Spouse name: Not on file     Number of children: Not on file     Years of education: Not on file     Highest education level: Not on file   Occupational History     Not on file   Tobacco Use     Smoking status: Never     Smokeless tobacco: Never   Substance and Sexual Activity     Alcohol use: Not Currently     Drug use: Never     Sexual activity: Not on file   Other Topics Concern     Not on file   Social History Narrative     Not on file     Social Determinants of Health     Financial Resource Strain: Not on file   Food Insecurity: Not on file   Transportation Needs: Not on file   Physical Activity: Not on file   Stress: Not on file   Social Connections: Not on file   Intimate Partner Violence: Not on file   Housing Stability: Not on file          Medications  Allergies   Scheduled Meds:  Continuous Infusions:  PRN Meds:. Allergies   Allergen Reactions     Metformin      Other reaction(s): stomach ache     Other Drug Allergy (See Comments) Unknown     Pt does not recall the med, caused nausea.         Lab Results    Chemistry/lipid CBC Cardiac  Enzymes/BNP/TSH/INR   Lab Results   Component Value Date    CHOL 136 03/30/2022    HDL 36 (L) 03/30/2022    TRIG 220 (H) 03/30/2022    BUN 16 03/30/2022     03/30/2022    CO2 17 (L) 03/30/2022    Lab Results   Component Value Date    WBC 7.2 01/11/2022    HGB 14.1 01/11/2022    HCT 41.2 01/11/2022    MCV 92 01/11/2022     01/11/2022    Lab Results   Component Value Date    TROPONINI 0.77 (HH) 10/08/2020    TSH 3.06 10/11/2020    INR 1.09 10/08/2020              Alvin Cannon MD  Interventional Cardiology  [facility]    Thank you for allowing me to participate in the care of your patient.      Sincerely,     Alvin Cannon MD     [facility]  cc:   No referring provider defined for this encounter.

## 2023-02-10 NOTE — PATIENT INSTRUCTIONS
Stop aspirin  Stop sotalol   Start metoprolol succinate 25mg daily and check your heart rate this weekend and early next week call us mid week and let us know the heart rate or sooner if symptoms of dizziness or shortness of breath or if HR >100 at rest or < 50 at rest.      Obtain blood tests fasting to check cholesterol

## 2023-02-10 NOTE — PROGRESS NOTES
"  Thank you,  No ref. provider found, for asking the [facility] team to see Mr. Shimon Duran to evaluate       Assessment/Recommendations   Assessment/Plan:  1. CAD s/pPCI - cont atorvastatin, stop asp given no event and on eliquis, check fasting lipids  2. afib - on sotalol and eliquis, check CBC/CMP, check QT on ECG today, stop sotalol and change to metoprolol succiante 25mg daily and track heart rate call next week and adjust based on HR.    3. HTN - on amlodipine/benazapril     Follow up in one year     History of Present Illness/Subjective    Mr. Shimon Duran is a 77 year old male with CAD s/p PCI 10/2020, afib, HTN doing well, no chest pain/pressure, dyspnea, PND/orthopnea, syncopal events, edema, GI/ bleeding.  Would liek tost op some medication.  He lost 30 lbs intentionally and feels great.           Physical Examination Review of Systems   /68 (BP Location: Right arm, Patient Position: Sitting, Cuff Size: Adult Large)   Pulse 85   Resp 16   Ht 1.778 m (5' 10\")   Wt 91.2 kg (201 lb)   BMI 28.84 kg/m    Body mass index is 28.84 kg/m .  Wt Readings from Last 3 Encounters:   02/10/23 91.2 kg (201 lb)   12/08/21 99.7 kg (219 lb 12.8 oz)   12/31/20 100.2 kg (221 lb)     [unfilled]  General Appearance:   no distress, normal body habitus   ENT/Mouth: membranes moist, no oral lesions or bleeding gums.      EYES:  no scleral icterus, normal conjunctivae   Neck: no carotid bruits or thyromegaly   Chest/Lungs:   lungs are clear to auscultation, no rales or wheezing,  sternal scar, equal chest wall expansion    Cardiovascular:   Regular. Normal first and second heart sounds with no murmurs, rubs, or gallops; the carotid, radial and posterior tibial pulses are intact, Jugular venous pressure , edema bilaterally    Abdomen:  no organomegaly, masses, bruits, or tenderness; bowel sounds are present   Extremities: no cyanosis or clubbing   Skin: no xanthelasma, warm.    Neurologic: normal "  bilateral, no tremors     Psychiatric: alert and oriented x3, calm     Review of Systems - 12 points nega other than above      Medical History  Surgical History Family History Social History   Past Medical History:   Diagnosis Date     Hyperlipidemia      Hypertension     Past Surgical History:   Procedure Laterality Date     CHOLECYSTECTOMY       CV CORONARY ANGIOGRAM N/A 10/8/2020    Procedure: Coronary Angiogram;  Surgeon: Alvin Cannon MD;  Location: [facility];  Service: Cardiology    Family History   Problem Relation Age of Onset     Coronary Artery Disease Father     Social History     Socioeconomic History     Marital status:      Spouse name: Not on file     Number of children: Not on file     Years of education: Not on file     Highest education level: Not on file   Occupational History     Not on file   Tobacco Use     Smoking status: Never     Smokeless tobacco: Never   Substance and Sexual Activity     Alcohol use: Not Currently     Drug use: Never     Sexual activity: Not on file   Other Topics Concern     Not on file   Social History Narrative     Not on file     Social Determinants of Health     Financial Resource Strain: Not on file   Food Insecurity: Not on file   Transportation Needs: Not on file   Physical Activity: Not on file   Stress: Not on file   Social Connections: Not on file   Intimate Partner Violence: Not on file   Housing Stability: Not on file          Medications  Allergies   Scheduled Meds:  Continuous Infusions:  PRN Meds:. Allergies   Allergen Reactions     Metformin      Other reaction(s): stomach ache     Other Drug Allergy (See Comments) Unknown     Pt does not recall the med, caused nausea.         Lab Results    Chemistry/lipid CBC Cardiac Enzymes/BNP/TSH/INR   Lab Results   Component Value Date    CHOL 136 03/30/2022    HDL 36 (L) 03/30/2022    TRIG 220 (H) 03/30/2022    BUN 16 03/30/2022     03/30/2022    CO2 17 (L) 03/30/2022    Lab  Results   Component Value Date    WBC 7.2 01/11/2022    HGB 14.1 01/11/2022    HCT 41.2 01/11/2022    MCV 92 01/11/2022     01/11/2022    Lab Results   Component Value Date    TROPONINI 0.77 (HH) 10/08/2020    TSH 3.06 10/11/2020    INR 1.09 10/08/2020              Alvin Cannon MD  Interventional Cardiology  [facility]

## 2023-02-14 ENCOUNTER — LAB REQUISITION (OUTPATIENT)
Dept: LAB | Facility: CLINIC | Age: 78
End: 2023-02-14

## 2023-02-14 ENCOUNTER — TRANSFERRED RECORDS (OUTPATIENT)
Dept: HEALTH INFORMATION MANAGEMENT | Facility: CLINIC | Age: 78
End: 2023-02-14
Payer: COMMERCIAL

## 2023-02-14 DIAGNOSIS — I48.91 UNSPECIFIED ATRIAL FIBRILLATION (H): ICD-10-CM

## 2023-02-14 DIAGNOSIS — E78.5 HYPERLIPIDEMIA, UNSPECIFIED: ICD-10-CM

## 2023-02-14 DIAGNOSIS — I10 ESSENTIAL (PRIMARY) HYPERTENSION: ICD-10-CM

## 2023-02-14 LAB
ALBUMIN SERPL BCG-MCNC: 4.3 G/DL (ref 3.5–5.2)
ALP SERPL-CCNC: 47 U/L (ref 40–129)
ALT SERPL W P-5'-P-CCNC: 26 U/L (ref 10–50)
ANION GAP SERPL CALCULATED.3IONS-SCNC: 14 MMOL/L (ref 7–15)
AST SERPL W P-5'-P-CCNC: 27 U/L (ref 10–50)
BASOPHILS # BLD AUTO: 0 10E3/UL (ref 0–0.2)
BASOPHILS NFR BLD AUTO: 1 %
BILIRUB SERPL-MCNC: 0.9 MG/DL
BUN SERPL-MCNC: 12.1 MG/DL (ref 8–23)
CALCIUM SERPL-MCNC: 9.3 MG/DL (ref 8.8–10.2)
CHLORIDE SERPL-SCNC: 102 MMOL/L (ref 98–107)
CHOLEST SERPL-MCNC: 117 MG/DL
CREAT SERPL-MCNC: 0.75 MG/DL (ref 0.67–1.17)
DEPRECATED HCO3 PLAS-SCNC: 24 MMOL/L (ref 22–29)
EOSINOPHIL # BLD AUTO: 0.2 10E3/UL (ref 0–0.7)
EOSINOPHIL NFR BLD AUTO: 3 %
ERYTHROCYTE [DISTWIDTH] IN BLOOD BY AUTOMATED COUNT: 12.3 % (ref 10–15)
GFR SERPL CREATININE-BSD FRML MDRD: >90 ML/MIN/1.73M2
GLUCOSE SERPL-MCNC: 120 MG/DL (ref 70–99)
HCT VFR BLD AUTO: 41.6 % (ref 40–53)
HDLC SERPL-MCNC: 47 MG/DL
HGB BLD-MCNC: 13.7 G/DL (ref 13.3–17.7)
IMM GRANULOCYTES # BLD: 0 10E3/UL
IMM GRANULOCYTES NFR BLD: 0 %
LDLC SERPL CALC-MCNC: 55 MG/DL
LYMPHOCYTES # BLD AUTO: 1.6 10E3/UL (ref 0.8–5.3)
LYMPHOCYTES NFR BLD AUTO: 27 %
MCH RBC QN AUTO: 31.1 PG (ref 26.5–33)
MCHC RBC AUTO-ENTMCNC: 32.9 G/DL (ref 31.5–36.5)
MCV RBC AUTO: 95 FL (ref 78–100)
MONOCYTES # BLD AUTO: 0.5 10E3/UL (ref 0–1.3)
MONOCYTES NFR BLD AUTO: 9 %
NEUTROPHILS # BLD AUTO: 3.7 10E3/UL (ref 1.6–8.3)
NEUTROPHILS NFR BLD AUTO: 60 %
NONHDLC SERPL-MCNC: 70 MG/DL
NRBC # BLD AUTO: 0 10E3/UL
NRBC BLD AUTO-RTO: 0 /100
PLATELET # BLD AUTO: 249 10E3/UL (ref 150–450)
POTASSIUM SERPL-SCNC: 4.5 MMOL/L (ref 3.4–5.3)
PROT SERPL-MCNC: 7.3 G/DL (ref 6.4–8.3)
RBC # BLD AUTO: 4.4 10E6/UL (ref 4.4–5.9)
SODIUM SERPL-SCNC: 140 MMOL/L (ref 136–145)
TRIGL SERPL-MCNC: 76 MG/DL
WBC # BLD AUTO: 6.1 10E3/UL (ref 4–11)

## 2023-02-14 PROCEDURE — 85004 AUTOMATED DIFF WBC COUNT: CPT | Performed by: FAMILY MEDICINE

## 2023-02-14 PROCEDURE — 80053 COMPREHEN METABOLIC PANEL: CPT | Performed by: FAMILY MEDICINE

## 2023-02-14 PROCEDURE — 80061 LIPID PANEL: CPT | Performed by: FAMILY MEDICINE

## 2023-02-16 ENCOUNTER — TELEPHONE (OUTPATIENT)
Dept: CARDIOLOGY | Facility: CLINIC | Age: 78
End: 2023-02-16
Payer: COMMERCIAL

## 2023-02-16 NOTE — TELEPHONE ENCOUNTER
[facility] Call Center    Phone Message    May a detailed message be left on voicemail: yes     Reason for Call: Medication Question or concern regarding medication   Prescription Clarification  Name of Medication: metoprolol succinate ER (TOPROL XL) 25 MG 24 hr tablet [71244]    Prescribing Provider: Dr Cannon     Pharmacy: [id number] - [place], [place] - [address]     What on the order needs clarification? The Pt stated that they are having side effects since they started taking this medication and was told to call in any changes. He has been having some dizzy spells and not felling good at all for a couple of days now. Blood Pressure is reading at 10:00 109/66 and then 1:15 121/78. Please follow up with Pt          Action Taken: Other: cardio    Travel Screening: Not Applicable     Thank you!  [facility]

## 2023-02-16 NOTE — TELEPHONE ENCOUNTER
Return call to pt.  Pt having some episodes of dizziness since change to metoprolol succinate 25 mg daily from sotalol.  Educated pt on metoprolol, and those symptoms may be common when he first starts the medication.  Advised pt to change positions slowly until his body gets used to the med.    HR has been between WNL, nothing >100 or <60.    Plan made to follow-up with pt Monday 2/20/23 to see if he is feeling any better.  jeff

## 2023-02-21 NOTE — TELEPHONE ENCOUNTER
Follow up call placed to pt.  Pt reports symptoms were gone the next day and he is feeling well.  -adri

## 2023-04-20 ENCOUNTER — LAB REQUISITION (OUTPATIENT)
Dept: LAB | Facility: CLINIC | Age: 78
End: 2023-04-20

## 2023-04-20 DIAGNOSIS — E11.9 TYPE 2 DIABETES MELLITUS WITHOUT COMPLICATIONS (H): ICD-10-CM

## 2023-04-20 LAB
CREAT UR-MCNC: 24.7 MG/DL
MICROALBUMIN UR-MCNC: <12 MG/L
MICROALBUMIN/CREAT UR: NORMAL MG/G{CREAT}

## 2023-04-20 PROCEDURE — 82570 ASSAY OF URINE CREATININE: CPT | Performed by: FAMILY MEDICINE

## 2023-12-21 ENCOUNTER — LAB REQUISITION (OUTPATIENT)
Dept: LAB | Facility: CLINIC | Age: 78
End: 2023-12-21

## 2023-12-21 DIAGNOSIS — E78.5 HYPERLIPIDEMIA, UNSPECIFIED: ICD-10-CM

## 2023-12-21 DIAGNOSIS — I25.10 ATHEROSCLEROTIC HEART DISEASE OF NATIVE CORONARY ARTERY WITHOUT ANGINA PECTORIS: ICD-10-CM

## 2023-12-21 DIAGNOSIS — E11.9 TYPE 2 DIABETES MELLITUS WITHOUT COMPLICATIONS (H): ICD-10-CM

## 2023-12-21 DIAGNOSIS — I10 ESSENTIAL (PRIMARY) HYPERTENSION: ICD-10-CM

## 2023-12-21 LAB
ALBUMIN SERPL BCG-MCNC: 4.1 G/DL (ref 3.5–5.2)
ALP SERPL-CCNC: 63 U/L (ref 40–150)
ALT SERPL W P-5'-P-CCNC: 43 U/L (ref 0–70)
ANION GAP SERPL CALCULATED.3IONS-SCNC: 16 MMOL/L (ref 7–15)
AST SERPL W P-5'-P-CCNC: 40 U/L (ref 0–45)
BILIRUB SERPL-MCNC: 0.7 MG/DL
BUN SERPL-MCNC: 13.7 MG/DL (ref 8–23)
CALCIUM SERPL-MCNC: 8.9 MG/DL (ref 8.8–10.2)
CHLORIDE SERPL-SCNC: 102 MMOL/L (ref 98–107)
CHOLEST SERPL-MCNC: 120 MG/DL
CREAT SERPL-MCNC: 0.76 MG/DL (ref 0.67–1.17)
DEPRECATED HCO3 PLAS-SCNC: 21 MMOL/L (ref 22–29)
EGFRCR SERPLBLD CKD-EPI 2021: >90 ML/MIN/1.73M2
FASTING STATUS PATIENT QL REPORTED: NORMAL
GLUCOSE SERPL-MCNC: 109 MG/DL (ref 70–99)
HDLC SERPL-MCNC: 48 MG/DL
LDLC SERPL CALC-MCNC: 46 MG/DL
NONHDLC SERPL-MCNC: 72 MG/DL
POTASSIUM SERPL-SCNC: 3.7 MMOL/L (ref 3.4–5.3)
PROT SERPL-MCNC: 7.5 G/DL (ref 6.4–8.3)
SODIUM SERPL-SCNC: 139 MMOL/L (ref 135–145)
TRIGL SERPL-MCNC: 131 MG/DL

## 2023-12-21 PROCEDURE — 80061 LIPID PANEL: CPT | Performed by: FAMILY MEDICINE

## 2023-12-21 PROCEDURE — 80053 COMPREHEN METABOLIC PANEL: CPT | Performed by: FAMILY MEDICINE

## 2024-04-05 DIAGNOSIS — E78.5 HYPERLIPIDEMIA LDL GOAL <70: ICD-10-CM

## 2024-04-05 DIAGNOSIS — I48.0 PAROXYSMAL ATRIAL FIBRILLATION (H): ICD-10-CM

## 2024-04-05 DIAGNOSIS — I21.11 ST ELEVATION MYOCARDIAL INFARCTION INVOLVING RIGHT CORONARY ARTERY (H): Primary | ICD-10-CM

## 2024-04-05 DIAGNOSIS — I25.10 CORONARY ARTERY DISEASE INVOLVING NATIVE CORONARY ARTERY OF NATIVE HEART WITHOUT ANGINA PECTORIS: ICD-10-CM

## 2024-04-05 RX ORDER — METOPROLOL SUCCINATE 25 MG/1
25 TABLET, EXTENDED RELEASE ORAL AT BEDTIME
Qty: 90 TABLET | Refills: 0 | Status: SHIPPED | OUTPATIENT
Start: 2024-04-05 | End: 2024-05-15

## 2024-04-18 DIAGNOSIS — I21.11 ST ELEVATION MYOCARDIAL INFARCTION INVOLVING RIGHT CORONARY ARTERY (H): ICD-10-CM

## 2024-04-18 RX ORDER — ATORVASTATIN CALCIUM 80 MG/1
80 TABLET, FILM COATED ORAL DAILY
Qty: 30 TABLET | Refills: 0 | Status: SHIPPED | OUTPATIENT
Start: 2024-04-18 | End: 2024-05-15

## 2024-04-25 ENCOUNTER — TRANSFERRED RECORDS (OUTPATIENT)
Dept: HEALTH INFORMATION MANAGEMENT | Facility: CLINIC | Age: 79
End: 2024-04-25
Payer: COMMERCIAL

## 2024-04-25 ENCOUNTER — LAB REQUISITION (OUTPATIENT)
Dept: LAB | Facility: CLINIC | Age: 79
End: 2024-04-25

## 2024-04-25 DIAGNOSIS — E11.9 TYPE 2 DIABETES MELLITUS WITHOUT COMPLICATIONS (H): ICD-10-CM

## 2024-04-25 PROCEDURE — 82374 ASSAY BLOOD CARBON DIOXIDE: CPT | Performed by: FAMILY MEDICINE

## 2024-04-25 PROCEDURE — 82043 UR ALBUMIN QUANTITATIVE: CPT | Performed by: FAMILY MEDICINE

## 2024-04-26 LAB
ANION GAP SERPL CALCULATED.3IONS-SCNC: 13 MMOL/L (ref 7–15)
BUN SERPL-MCNC: 13.4 MG/DL (ref 8–23)
CALCIUM SERPL-MCNC: 8.9 MG/DL (ref 8.8–10.2)
CHLORIDE SERPL-SCNC: 109 MMOL/L (ref 98–107)
CREAT SERPL-MCNC: 0.74 MG/DL (ref 0.67–1.17)
CREAT UR-MCNC: 39.5 MG/DL
DEPRECATED HCO3 PLAS-SCNC: 21 MMOL/L (ref 22–29)
EGFRCR SERPLBLD CKD-EPI 2021: >90 ML/MIN/1.73M2
GLUCOSE SERPL-MCNC: 126 MG/DL (ref 70–99)
MICROALBUMIN UR-MCNC: <12 MG/L
MICROALBUMIN/CREAT UR: NORMAL MG/G{CREAT}
POTASSIUM SERPL-SCNC: 4.2 MMOL/L (ref 3.4–5.3)
SODIUM SERPL-SCNC: 143 MMOL/L (ref 135–145)

## 2024-05-14 NOTE — PROGRESS NOTES
HEART CARE ENCOUNTER CONSULTATON NOTE      [facility]  [phone]      Assessment/Recommendations   Assessment:   Coronary artery disease: STEMI and PTCA/DESx1 to rPL 2020, residual 50% stenosis of LCxnot on aspirin due to OAC - denies angina  Permanent atrial fibrillation: Eliquis, rate controlled on metoprolol. Failed sotalol therapy.  Hypertension: Controlled on amlodipine-benazepril, metoprolol succinate  Dyslipidemia: LDL 46 atorvastatin 80 mg  Right rotator cuff injury: repair scheduled in August         Plan:   Patient has known coronary artery disease, permanent atrial fibrillation.  No active angina, heart failure, RVR.  Without active symptomology there is no further cardiac workup to reduce risk needed prior to surgery at this time unless new symptoms develop before August  OK to hold Eliquis 2 days prior to surgery, resume afterward  Nitroglycerin refilled with history of CAD/MI        Follow up in 1 year or sooner as needed     History of Present Illness/Subjective    HPI: Shimon Duran is a 79 year old male with PMHx of CAD, STEMI with BO x 1 permanent atrial fibrillation, HTN, HLD presents for follow-up.    Patient is feeling well besides for right rotator cuff tear following a fall/slip on ice this winter.  This causes him some pain and weakness in the arm, and is planning to have repair done with [facility] in August 2024.  He denies any chest pain, shortness of breath and he walks frequently for exercise.  Asked for refill of nitroglycerin, and is not needing this medication and wants it for safety.  Denies any racing heart rates, palpitations with history of persistent atrial fibrillation.  Denies blood in stool, nosebleeds or other blood new bleeding on Eliquis.  Blood pressure at home is well-controlled.  For shoulder pain and also leg cramping he uses TENS unit which provides relief.  Denies claudication symptoms.    He denies lightheadedness, orthopnea, and PND.  "       Echocardiogram 2020 Results:  1. Normal left ventricular size and systolic performance with a visually estimated ejection fraction of 55-60%.   2. There is moderate to severe basal inferior hypokinesis.  3. No significant valvular heart disease is identified on this study.   4. Normal right ventricular size and systolic performance.     Coronary angiogram 2020:    1st RPL lesion is 99% stenosed.    The LM vessel was large and is considered normal.    Estimated blood loss was <20 ml.    The LAD vessel was moderate .    A drug eluting stent was successfully placed.    Ost Cx to Prox Cx lesion is 50% stenosed.     74 yo male with hx of HTN and presents with chest pain and ST elevatoin MI     Angiography via left radial   LM normal  LAD mild dz  Circ ostial 50%  RCA in PL branch 99% KATELYN 3 flow     PCI: PTCA/BO to PL      Imp/plan  1. Acute ST inferior wall MI s/p PCI - asp/ticagrelor for 9 months, high dose statin, BP control, echo in AM, cardiac rehab, follow up with NP in 1-2 weeks and if symptoms would stress nuclear imaging given residual 50% ostia Circ.  Follow up with   myself in 10-12 weeks.      Physical Examination  Review of Systems   Vitals: /76 (BP Location: Right arm, Patient Position: Sitting, Cuff Size: Adult Large)   Pulse 86   Resp 16   Ht 1.778 m (5' 10\")   Wt 95.3 kg (210 lb)   BMI 30.13 kg/m    BMI= Body mass index is 30.13 kg/m .  Wt Readings from Last 3 Encounters:   05/15/24 95.3 kg (210 lb)   02/10/23 91.2 kg (201 lb)   12/08/21 99.7 kg (219 lb 12.8 oz)           ENT/Mouth: membranes moist, no oral lesions or bleeding gums.      EYES:  no scleral icterus, normal conjunctivae                    Neck: No carotid bruit or thyromegaly   Chest/Lungs:   lungs are clear to auscultation, no rales or wheezing, equal chest wall expansion    Cardiovascular:   Regular. Normal first and second heart sounds with no murmurs, rubs, or gallops; the carotid, radial and posterior tibial pulses " are intact, trace edema bilaterally        Extremities: no cyanosis or clubbing   Skin: no xanthelasma, warm.    Neurologic: no tremors     Psychiatric: alert and oriented x3, calm        Please refer above for cardiac ROS details.        Medical History  Surgical History Family History Social History   Past Medical History:   Diagnosis Date    Hyperlipidemia     Hypertension      Past Surgical History:   Procedure Laterality Date    CHOLECYSTECTOMY      CV CORONARY ANGIOGRAM N/A 10/8/2020    Procedure: Coronary Angiogram;  Surgeon: Alvin Cannon MD;  Location: [facility];  Service: Cardiology     Family History   Problem Relation Age of Onset    Coronary Artery Disease Father         Social History     Socioeconomic History    Marital status:      Spouse name: Not on file    Number of children: Not on file    Years of education: Not on file    Highest education level: Not on file   Occupational History    Not on file   Tobacco Use    Smoking status: Never    Smokeless tobacco: Never   Substance and Sexual Activity    Alcohol use: Not Currently    Drug use: Never    Sexual activity: Not on file   Other Topics Concern    Not on file   Social History Narrative    Not on file     Social Determinants of Health     Financial Resource Strain: Not on file   Food Insecurity: Not on file   Transportation Needs: Not on file   Physical Activity: Not on file   Stress: Not on file   Social Connections: Not on file   Interpersonal Safety: Not on file   Housing Stability: Not on file           Medications  Allergies   Current Outpatient Medications   Medication Sig Dispense Refill    acetaminophen (TYLENOL) 500 MG tablet [ACETAMINOPHEN (TYLENOL) 500 MG TABLET] Take 500 mg by mouth every 6 (six) hours as needed for pain.      amLODIPine-benazepril (LOTREL) 10-40 mg per capsule [AMLODIPINE-BENAZEPRIL (LOTREL) 10-40 MG PER CAPSULE] Take 1 capsule by mouth daily.      apixaban ANTICOAGULANT (ELIQUIS) 5 MG  "tablet Take 1 tablet (5 mg) by mouth 2 times daily 180 tablet 11    atorvastatin (LIPITOR) 80 MG tablet TAKE ONE TABLET BY MOUTH ONE TIME DAILY 30 tablet 0    Cholecalciferol (VITAMIN D) 50 MCG (2000 UT) CAPS Take 4,000 Units by mouth daily      diclofenac (VOLTAREN) 1 % topical gel Apply 2 g topically 4 times daily as needed for moderate pain (Apply to affected shoulder as needed for pain.)      metoprolol succinate ER (TOPROL XL) 25 MG 24 hr tablet Take 1 tablet (25 mg) by mouth At Bedtime 90 tablet 0    multivitamin, therapeutic (THERA-VIT) TABS tablet Take 1 tablet by mouth daily      nitroglycerin (NITROSTAT) 0.4 MG SL tablet [NITROGLYCERIN (NITROSTAT) 0.4 MG SL TABLET] Place 1 tablet (0.4 mg total) under the tongue every 5 (five) minutes as needed for chest pain. 1 Bottle 0    omeprazole (PRILOSEC) 20 MG capsule [OMEPRAZOLE (PRILOSEC) 20 MG CAPSULE] Take 2 capsules (40 mg total) by mouth daily before breakfast. 30 capsule 0    clotrimazole (LOTRIMIN) 1 % cream [CLOTRIMAZOLE (LOTRIMIN) 1 % CREAM] Apply 1 application topically 2 (two) times a day. Apply to underarms (Patient not taking: Reported on 12/8/2021)         Allergies   Allergen Reactions    Metformin      Other reaction(s): stomach ache    Other Drug Allergy (See Comments) Unknown     Pt does not recall the med, caused nausea.          Lab Results    Chemistry/lipid CBC Cardiac Enzymes/BNP/TSH/INR   Recent Labs   Lab Test 12/21/23  0932   CHOL 120   HDL 48   LDL 46   TRIG 131     Recent Labs   Lab Test 12/21/23  0932 02/14/23  0911 03/30/22  0910   LDL 46 55 56     Recent Labs   Lab Test 04/25/24  0820      POTASSIUM 4.2   CHLORIDE 109*   CO2 21*   *   BUN 13.4   CR 0.74   GFRESTIMATED >90   DYLON 8.9     Recent Labs   Lab Test 04/25/24  0820 12/21/23  0932 02/14/23  0911   CR 0.74 0.76 0.75     No results for input(s): \"A1C\" in the last 38611 hours.       Recent Labs   Lab Test 02/14/23  0911   WBC 6.1   HGB 13.7   HCT 41.6   MCV 95   PLT " "249     Recent Labs   Lab Test 02/14/23  0911 01/11/22  0803 10/09/20  0502   HGB 13.7 14.1 13.2*    Recent Labs   Lab Test 10/08/20  2145   TROPONINI 0.77*     No results for input(s): \"BNP\", \"NTBNPI\", \"NTBNP\" in the last 06692 hours.  Recent Labs   Lab Test 10/11/20  0615   TSH 3.06     Recent Labs   Lab Test 10/08/20  2145   INR 1.09          This note has been dictated using voice recognition software. Any grammatical, typographical, or context distortions are unintentional and inherent to the software    Kate Nazario PA-C                                       "

## 2024-05-15 ENCOUNTER — OFFICE VISIT (OUTPATIENT)
Dept: CARDIOLOGY | Facility: CLINIC | Age: 79
End: 2024-05-15
Payer: COMMERCIAL

## 2024-05-15 VITALS
DIASTOLIC BLOOD PRESSURE: 76 MMHG | SYSTOLIC BLOOD PRESSURE: 138 MMHG | RESPIRATION RATE: 16 BRPM | HEART RATE: 86 BPM | HEIGHT: 70 IN | WEIGHT: 210 LBS | BODY MASS INDEX: 30.06 KG/M2

## 2024-05-15 DIAGNOSIS — E78.5 HYPERLIPIDEMIA LDL GOAL <70: ICD-10-CM

## 2024-05-15 DIAGNOSIS — I10 ESSENTIAL HYPERTENSION: ICD-10-CM

## 2024-05-15 DIAGNOSIS — I21.11 ST ELEVATION MYOCARDIAL INFARCTION INVOLVING RIGHT CORONARY ARTERY (H): ICD-10-CM

## 2024-05-15 DIAGNOSIS — I25.10 CORONARY ARTERY DISEASE INVOLVING NATIVE CORONARY ARTERY OF NATIVE HEART WITHOUT ANGINA PECTORIS: Primary | ICD-10-CM

## 2024-05-15 DIAGNOSIS — I48.21 PERMANENT ATRIAL FIBRILLATION (H): ICD-10-CM

## 2024-05-15 PROCEDURE — 99204 OFFICE O/P NEW MOD 45 MIN: CPT

## 2024-05-15 RX ORDER — METOPROLOL SUCCINATE 25 MG/1
25 TABLET, EXTENDED RELEASE ORAL AT BEDTIME
Qty: 90 TABLET | Refills: 3 | Status: SHIPPED | OUTPATIENT
Start: 2024-05-15

## 2024-05-15 RX ORDER — NITROGLYCERIN 0.4 MG/1
0.4 TABLET SUBLINGUAL EVERY 5 MIN PRN
Qty: 30 TABLET | Refills: 1 | Status: SHIPPED | OUTPATIENT
Start: 2024-05-15

## 2024-05-15 RX ORDER — ATORVASTATIN CALCIUM 80 MG/1
80 TABLET, FILM COATED ORAL DAILY
Qty: 90 TABLET | Refills: 3 | Status: SHIPPED | OUTPATIENT
Start: 2024-05-15

## 2024-05-15 NOTE — LETTER
5/15/2024    Mode Adkins MD  [address] [place] [address]    RE: Shimon Duran       Dear Colleague,     I had the pleasure of seeing Shimon Duran in the [facility].    HEART CARE ENCOUNTER CONSULTATON NOTE      [facility]  [phone]      Assessment/Recommendations   Assessment:   Coronary artery disease: STEMI and PTCA/DESx1 to rPL 2020, residual 50% stenosis of LCxnot on aspirin due to OAC - denies angina  Permanent atrial fibrillation: Eliquis, rate controlled on metoprolol. Failed sotalol therapy.  Hypertension: Controlled on amlodipine-benazepril, metoprolol succinate  Dyslipidemia: LDL 46 atorvastatin 80 mg  Right rotator cuff injury: repair scheduled in August         Plan:   Patient has known coronary artery disease, permanent atrial fibrillation.  No active angina, heart failure, RVR.  Without active symptomology there is no further cardiac workup to reduce risk needed prior to surgery at this time unless new symptoms develop before August  OK to hold Eliquis 2 days prior to surgery, resume afterward  Nitroglycerin refilled with history of CAD/MI        Follow up in 1 year or sooner as needed     History of Present Illness/Subjective    HPI: Shimon Duran is a 79 year old male with PMHx of CAD, STEMI with BO x 1 permanent atrial fibrillation, HTN, HLD presents for follow-up.    Patient is feeling well besides for right rotator cuff tear following a fall/slip on ice this winter.  This causes him some pain and weakness in the arm, and is planning to have repair done with [facility] in August 2024.  He denies any chest pain, shortness of breath and he walks frequently for exercise.  Asked for refill of nitroglycerin, and is not needing this medication and wants it for safety.  Denies any racing heart rates, palpitations with history of persistent atrial fibrillation.  Denies blood in stool, nosebleeds or other blood new bleeding on Eliquis.  Blood  "pressure at home is well-controlled.  For shoulder pain and also leg cramping he uses TENS unit which provides relief.  Denies claudication symptoms.    He denies lightheadedness, orthopnea, and PND.        Echocardiogram 2020 Results:  1. Normal left ventricular size and systolic performance with a visually estimated ejection fraction of 55-60%.   2. There is moderate to severe basal inferior hypokinesis.  3. No significant valvular heart disease is identified on this study.   4. Normal right ventricular size and systolic performance.     Coronary angiogram 2020:    1st RPL lesion is 99% stenosed.    The LM vessel was large and is considered normal.    Estimated blood loss was <20 ml.    The LAD vessel was moderate .    A drug eluting stent was successfully placed.    Ost Cx to Prox Cx lesion is 50% stenosed.     76 yo male with hx of HTN and presents with chest pain and ST elevatoin MI     Angiography via left radial   LM normal  LAD mild dz  Circ ostial 50%  RCA in PL branch 99% KATELYN 3 flow     PCI: PTCA/BO to PL      Imp/plan  1. Acute ST inferior wall MI s/p PCI - asp/ticagrelor for 9 months, high dose statin, BP control, echo in AM, cardiac rehab, follow up with NP in 1-2 weeks and if symptoms would stress nuclear imaging given residual 50% ostia Circ.  Follow up with   myself in 10-12 weeks.      Physical Examination  Review of Systems   Vitals: /76 (BP Location: Right arm, Patient Position: Sitting, Cuff Size: Adult Large)   Pulse 86   Resp 16   Ht 1.778 m (5' 10\")   Wt 95.3 kg (210 lb)   BMI 30.13 kg/m    BMI= Body mass index is 30.13 kg/m .  Wt Readings from Last 3 Encounters:   05/15/24 95.3 kg (210 lb)   02/10/23 91.2 kg (201 lb)   12/08/21 99.7 kg (219 lb 12.8 oz)           ENT/Mouth: membranes moist, no oral lesions or bleeding gums.      EYES:  no scleral icterus, normal conjunctivae                    Neck: No carotid bruit or thyromegaly   Chest/Lungs:   lungs are clear to auscultation, " no rales or wheezing, equal chest wall expansion    Cardiovascular:   Regular. Normal first and second heart sounds with no murmurs, rubs, or gallops; the carotid, radial and posterior tibial pulses are intact, trace edema bilaterally        Extremities: no cyanosis or clubbing   Skin: no xanthelasma, warm.    Neurologic: no tremors     Psychiatric: alert and oriented x3, calm        Please refer above for cardiac ROS details.        Medical History  Surgical History Family History Social History   Past Medical History:   Diagnosis Date    Hyperlipidemia     Hypertension      Past Surgical History:   Procedure Laterality Date    CHOLECYSTECTOMY      CV CORONARY ANGIOGRAM N/A 10/8/2020    Procedure: Coronary Angiogram;  Surgeon: Alvin Cannon MD;  Location: [facility];  Service: Cardiology     Family History   Problem Relation Age of Onset    Coronary Artery Disease Father         Social History     Socioeconomic History    Marital status:      Spouse name: Not on file    Number of children: Not on file    Years of education: Not on file    Highest education level: Not on file   Occupational History    Not on file   Tobacco Use    Smoking status: Never    Smokeless tobacco: Never   Substance and Sexual Activity    Alcohol use: Not Currently    Drug use: Never    Sexual activity: Not on file   Other Topics Concern    Not on file   Social History Narrative    Not on file     Social Determinants of Health     Financial Resource Strain: Not on file   Food Insecurity: Not on file   Transportation Needs: Not on file   Physical Activity: Not on file   Stress: Not on file   Social Connections: Not on file   Interpersonal Safety: Not on file   Housing Stability: Not on file           Medications  Allergies   Current Outpatient Medications   Medication Sig Dispense Refill    acetaminophen (TYLENOL) 500 MG tablet [ACETAMINOPHEN (TYLENOL) 500 MG TABLET] Take 500 mg by mouth every 6 (six) hours as  needed for pain.      amLODIPine-benazepril (LOTREL) 10-40 mg per capsule [AMLODIPINE-BENAZEPRIL (LOTREL) 10-40 MG PER CAPSULE] Take 1 capsule by mouth daily.      apixaban ANTICOAGULANT (ELIQUIS) 5 MG tablet Take 1 tablet (5 mg) by mouth 2 times daily 180 tablet 11    atorvastatin (LIPITOR) 80 MG tablet TAKE ONE TABLET BY MOUTH ONE TIME DAILY 30 tablet 0    Cholecalciferol (VITAMIN D) 50 MCG (2000 UT) CAPS Take 4,000 Units by mouth daily      diclofenac (VOLTAREN) 1 % topical gel Apply 2 g topically 4 times daily as needed for moderate pain (Apply to affected shoulder as needed for pain.)      metoprolol succinate ER (TOPROL XL) 25 MG 24 hr tablet Take 1 tablet (25 mg) by mouth At Bedtime 90 tablet 0    multivitamin, therapeutic (THERA-VIT) TABS tablet Take 1 tablet by mouth daily      nitroglycerin (NITROSTAT) 0.4 MG SL tablet [NITROGLYCERIN (NITROSTAT) 0.4 MG SL TABLET] Place 1 tablet (0.4 mg total) under the tongue every 5 (five) minutes as needed for chest pain. 1 Bottle 0    omeprazole (PRILOSEC) 20 MG capsule [OMEPRAZOLE (PRILOSEC) 20 MG CAPSULE] Take 2 capsules (40 mg total) by mouth daily before breakfast. 30 capsule 0    clotrimazole (LOTRIMIN) 1 % cream [CLOTRIMAZOLE (LOTRIMIN) 1 % CREAM] Apply 1 application topically 2 (two) times a day. Apply to underarms (Patient not taking: Reported on 12/8/2021)         Allergies   Allergen Reactions    Metformin      Other reaction(s): stomach ache    Other Drug Allergy (See Comments) Unknown     Pt does not recall the med, caused nausea.          Lab Results    Chemistry/lipid CBC Cardiac Enzymes/BNP/TSH/INR   Recent Labs   Lab Test 12/21/23  0932   CHOL 120   HDL 48   LDL 46   TRIG 131     Recent Labs   Lab Test 12/21/23  0932 02/14/23  0911 03/30/22  0910   LDL 46 55 56     Recent Labs   Lab Test 04/25/24  0820      POTASSIUM 4.2   CHLORIDE 109*   CO2 21*   *   BUN 13.4   CR 0.74   GFRESTIMATED >90   DYLON 8.9     Recent Labs   Lab Test 04/25/24  0820  "12/21/23  0932 02/14/23  0911   CR 0.74 0.76 0.75     No results for input(s): \"A1C\" in the last 07782 hours.       Recent Labs   Lab Test 02/14/23  0911   WBC 6.1   HGB 13.7   HCT 41.6   MCV 95        Recent Labs   Lab Test 02/14/23  0911 01/11/22  0803 10/09/20  0502   HGB 13.7 14.1 13.2*    Recent Labs   Lab Test 10/08/20  2145   TROPONINI 0.77*     No results for input(s): \"BNP\", \"NTBNPI\", \"NTBNP\" in the last 78454 hours.  Recent Labs   Lab Test 10/11/20  0615   TSH 3.06     Recent Labs   Lab Test 10/08/20  2145   INR 1.09          This note has been dictated using voice recognition software. Any grammatical, typographical, or context distortions are unintentional and inherent to the software    Kate Nazario PA-C              Thank you for allowing me to participate in the care of your patient.      Sincerely,     Kate Almodovar PA-C     [facility]  cc:   Alvin Cannon MD  [address]  [place] [address]      "

## 2024-07-18 ENCOUNTER — LAB REQUISITION (OUTPATIENT)
Dept: LAB | Facility: CLINIC | Age: 79
End: 2024-07-18

## 2024-07-18 DIAGNOSIS — I10 ESSENTIAL (PRIMARY) HYPERTENSION: ICD-10-CM

## 2024-07-18 LAB
ANION GAP SERPL CALCULATED.3IONS-SCNC: 11 MMOL/L (ref 7–15)
BUN SERPL-MCNC: 13.9 MG/DL (ref 8–23)
CALCIUM SERPL-MCNC: 8.8 MG/DL (ref 8.8–10.4)
CHLORIDE SERPL-SCNC: 105 MMOL/L (ref 98–107)
CREAT SERPL-MCNC: 0.77 MG/DL (ref 0.67–1.17)
EGFRCR SERPLBLD CKD-EPI 2021: >90 ML/MIN/1.73M2
GLUCOSE SERPL-MCNC: 118 MG/DL (ref 70–99)
HCO3 SERPL-SCNC: 22 MMOL/L (ref 22–29)
POTASSIUM SERPL-SCNC: 4.2 MMOL/L (ref 3.4–5.3)
SODIUM SERPL-SCNC: 138 MMOL/L (ref 135–145)

## 2024-07-18 PROCEDURE — 80048 BASIC METABOLIC PNL TOTAL CA: CPT | Performed by: FAMILY MEDICINE

## 2024-08-20 ENCOUNTER — LAB REQUISITION (OUTPATIENT)
Dept: LAB | Facility: CLINIC | Age: 79
End: 2024-08-20

## 2024-08-20 DIAGNOSIS — E11.9 TYPE 2 DIABETES MELLITUS WITHOUT COMPLICATIONS (H): ICD-10-CM

## 2024-08-20 PROCEDURE — 80048 BASIC METABOLIC PNL TOTAL CA: CPT | Performed by: FAMILY MEDICINE

## 2024-08-21 LAB
ANION GAP SERPL CALCULATED.3IONS-SCNC: 16 MMOL/L (ref 7–15)
BUN SERPL-MCNC: 12 MG/DL (ref 8–23)
CALCIUM SERPL-MCNC: 8.6 MG/DL (ref 8.8–10.4)
CHLORIDE SERPL-SCNC: 100 MMOL/L (ref 98–107)
CREAT SERPL-MCNC: 0.68 MG/DL (ref 0.67–1.17)
EGFRCR SERPLBLD CKD-EPI 2021: >90 ML/MIN/1.73M2
GLUCOSE SERPL-MCNC: 133 MG/DL (ref 70–99)
HCO3 SERPL-SCNC: 17 MMOL/L (ref 22–29)
POTASSIUM SERPL-SCNC: 4.1 MMOL/L (ref 3.4–5.3)
SODIUM SERPL-SCNC: 133 MMOL/L (ref 135–145)

## 2024-08-26 ENCOUNTER — TELEPHONE (OUTPATIENT)
Dept: CARDIOLOGY | Facility: CLINIC | Age: 79
End: 2024-08-26
Payer: COMMERCIAL

## 2024-08-26 NOTE — TELEPHONE ENCOUNTER
[facility] Call Center    Phone Message    May a detailed message be left on voicemail: yes     Reason for Call: Other: Scarlet called requesting to speak with Grow's care team about his medications and the instructions they were given regarding continuing his medications following his procedure. Please reach out to Scarlet to discuss. Thank you!     Action Taken: Other: Cardiology    Travel Screening: Not Applicable    Thank you!  [facility]       Date of Service:

## 2024-08-26 NOTE — TELEPHONE ENCOUNTER
PC to patient, spoke to his spouse Scalret. Reviewed medication instructions the patient received on recent discharge following shoulder surgery through [facility]. She states patient was instructed to hold his amlodipine-benazepril d/t low BP readings. She is asking if metoprolol should be held as well. Instructed her the patient should continue his medications per the discharge orders. Offered follow-up with this office for medication review, she declined. She verbalized agreement and had no further questions.  -sea

## 2024-11-19 ENCOUNTER — LAB REQUISITION (OUTPATIENT)
Dept: LAB | Facility: CLINIC | Age: 79
End: 2024-11-19

## 2024-11-19 DIAGNOSIS — E78.5 HYPERLIPIDEMIA, UNSPECIFIED: ICD-10-CM

## 2024-11-19 DIAGNOSIS — I25.10 ATHEROSCLEROTIC HEART DISEASE OF NATIVE CORONARY ARTERY WITHOUT ANGINA PECTORIS: ICD-10-CM

## 2024-11-19 DIAGNOSIS — E11.9 TYPE 2 DIABETES MELLITUS WITHOUT COMPLICATIONS (H): ICD-10-CM

## 2024-11-19 DIAGNOSIS — I10 ESSENTIAL (PRIMARY) HYPERTENSION: ICD-10-CM

## 2024-11-19 LAB
ALBUMIN SERPL BCG-MCNC: 4.1 G/DL (ref 3.5–5.2)
ALP SERPL-CCNC: 52 U/L (ref 40–150)
ALT SERPL W P-5'-P-CCNC: 19 U/L (ref 0–70)
ANION GAP SERPL CALCULATED.3IONS-SCNC: 12 MMOL/L (ref 7–15)
AST SERPL W P-5'-P-CCNC: 30 U/L (ref 0–45)
BILIRUB SERPL-MCNC: 0.5 MG/DL
BUN SERPL-MCNC: 13.7 MG/DL (ref 8–23)
CALCIUM SERPL-MCNC: 9.2 MG/DL (ref 8.8–10.4)
CHLORIDE SERPL-SCNC: 103 MMOL/L (ref 98–107)
CHOLEST SERPL-MCNC: 128 MG/DL
CREAT SERPL-MCNC: 0.72 MG/DL (ref 0.67–1.17)
EGFRCR SERPLBLD CKD-EPI 2021: >90 ML/MIN/1.73M2
FASTING STATUS PATIENT QL REPORTED: ABNORMAL
FASTING STATUS PATIENT QL REPORTED: NORMAL
GLUCOSE SERPL-MCNC: 115 MG/DL (ref 70–99)
HCO3 SERPL-SCNC: 22 MMOL/L (ref 22–29)
HDLC SERPL-MCNC: 53 MG/DL
LDLC SERPL CALC-MCNC: 53 MG/DL
NONHDLC SERPL-MCNC: 75 MG/DL
POTASSIUM SERPL-SCNC: 4.2 MMOL/L (ref 3.4–5.3)
PROT SERPL-MCNC: 7.8 G/DL (ref 6.4–8.3)
SODIUM SERPL-SCNC: 137 MMOL/L (ref 135–145)
TRIGL SERPL-MCNC: 109 MG/DL

## 2024-11-19 PROCEDURE — 80061 LIPID PANEL: CPT | Performed by: FAMILY MEDICINE

## 2024-11-19 PROCEDURE — 82040 ASSAY OF SERUM ALBUMIN: CPT | Performed by: FAMILY MEDICINE

## 2024-11-19 PROCEDURE — 82947 ASSAY GLUCOSE BLOOD QUANT: CPT | Performed by: FAMILY MEDICINE

## 2024-11-19 PROCEDURE — 82247 BILIRUBIN TOTAL: CPT | Performed by: FAMILY MEDICINE

## 2025-04-12 ENCOUNTER — HOSPITAL ENCOUNTER (EMERGENCY)
Facility: HOSPITAL | Age: 80
Discharge: HOME | End: 2025-04-12
Attending: EMERGENCY MEDICINE | Admitting: EMERGENCY MEDICINE
Payer: COMMERCIAL

## 2025-04-12 ENCOUNTER — APPOINTMENT (OUTPATIENT)
Dept: RADIOLOGY | Facility: HOSPITAL | Age: 80
End: 2025-04-12
Attending: EMERGENCY MEDICINE
Payer: COMMERCIAL

## 2025-04-12 ENCOUNTER — APPOINTMENT (OUTPATIENT)
Dept: ULTRASOUND IMAGING | Facility: HOSPITAL | Age: 80
End: 2025-04-12
Attending: EMERGENCY MEDICINE
Payer: COMMERCIAL

## 2025-04-12 VITALS
SYSTOLIC BLOOD PRESSURE: 115 MMHG | TEMPERATURE: 97.7 F | WEIGHT: 220 LBS | DIASTOLIC BLOOD PRESSURE: 64 MMHG | RESPIRATION RATE: 17 BRPM | OXYGEN SATURATION: 97 % | BODY MASS INDEX: 31.57 KG/M2 | HEART RATE: 68 BPM

## 2025-04-12 DIAGNOSIS — R06.02 SHORTNESS OF BREATH: ICD-10-CM

## 2025-04-12 DIAGNOSIS — M79.642 PAIN OF LEFT HAND: ICD-10-CM

## 2025-04-12 LAB
ALBUMIN SERPL BCG-MCNC: 4.3 G/DL (ref 3.5–5.2)
ALP SERPL-CCNC: 60 U/L (ref 40–150)
ALT SERPL W P-5'-P-CCNC: 21 U/L (ref 0–70)
ANION GAP SERPL CALCULATED.3IONS-SCNC: 13 MMOL/L (ref 7–15)
AST SERPL W P-5'-P-CCNC: 31 U/L (ref 0–45)
ATRIAL RATE - MUSE: 108 BPM
BILIRUB DIRECT SERPL-MCNC: 0.15 MG/DL (ref 0–0.3)
BILIRUB SERPL-MCNC: 0.4 MG/DL
BUN SERPL-MCNC: 13.6 MG/DL (ref 8–23)
CALCIUM SERPL-MCNC: 9.1 MG/DL (ref 8.8–10.4)
CHLORIDE SERPL-SCNC: 104 MMOL/L (ref 98–107)
CREAT SERPL-MCNC: 0.75 MG/DL (ref 0.67–1.17)
DIASTOLIC BLOOD PRESSURE - MUSE: 89 MMHG
EGFRCR SERPLBLD CKD-EPI 2021: >90 ML/MIN/1.73M2
ERYTHROCYTE [DISTWIDTH] IN BLOOD BY AUTOMATED COUNT: 12.1 % (ref 10–15)
GLUCOSE SERPL-MCNC: 183 MG/DL (ref 70–99)
HCO3 SERPL-SCNC: 24 MMOL/L (ref 22–29)
HCT VFR BLD AUTO: 38.9 % (ref 40–53)
HGB BLD-MCNC: 13.4 G/DL (ref 13.3–17.7)
HOLD SPECIMEN: NORMAL
INR PPP: 1.29 (ref 0.85–1.15)
INTERPRETATION ECG - MUSE: NORMAL
LIPASE SERPL-CCNC: 26 U/L (ref 13–60)
MCH RBC QN AUTO: 31 PG (ref 26.5–33)
MCHC RBC AUTO-ENTMCNC: 34.4 G/DL (ref 31.5–36.5)
MCV RBC AUTO: 90 FL (ref 78–100)
NT-PROBNP SERPL-MCNC: 644 PG/ML (ref 0–1800)
P AXIS - MUSE: NORMAL DEGREES
PLATELET # BLD AUTO: 271 10E3/UL (ref 150–450)
POTASSIUM SERPL-SCNC: 4 MMOL/L (ref 3.4–5.3)
PR INTERVAL - MUSE: NORMAL MS
PROT SERPL-MCNC: 8.1 G/DL (ref 6.4–8.3)
QRS DURATION - MUSE: 76 MS
QT - MUSE: 356 MS
QTC - MUSE: 440 MS
R AXIS - MUSE: -18 DEGREES
RBC # BLD AUTO: 4.32 10E6/UL (ref 4.4–5.9)
SODIUM SERPL-SCNC: 141 MMOL/L (ref 135–145)
SYSTOLIC BLOOD PRESSURE - MUSE: 133 MMHG
T AXIS - MUSE: 82 DEGREES
TROPONIN T SERPL HS-MCNC: 14 NG/L
TROPONIN T SERPL HS-MCNC: 16 NG/L
VENTRICULAR RATE- MUSE: 92 BPM
WBC # BLD AUTO: 7.1 10E3/UL (ref 4–11)

## 2025-04-12 PROCEDURE — 250N000011 HC RX IP 250 OP 636: Mod: JZ | Performed by: EMERGENCY MEDICINE

## 2025-04-12 PROCEDURE — 93005 ELECTROCARDIOGRAM TRACING: CPT | Performed by: EMERGENCY MEDICINE

## 2025-04-12 PROCEDURE — 84484 ASSAY OF TROPONIN QUANT: CPT | Performed by: EMERGENCY MEDICINE

## 2025-04-12 PROCEDURE — 36415 COLL VENOUS BLD VENIPUNCTURE: CPT | Performed by: EMERGENCY MEDICINE

## 2025-04-12 PROCEDURE — 93971 EXTREMITY STUDY: CPT | Mod: LT

## 2025-04-12 PROCEDURE — 83880 ASSAY OF NATRIURETIC PEPTIDE: CPT | Performed by: EMERGENCY MEDICINE

## 2025-04-12 PROCEDURE — 71046 X-RAY EXAM CHEST 2 VIEWS: CPT

## 2025-04-12 PROCEDURE — 80048 BASIC METABOLIC PNL TOTAL CA: CPT | Performed by: EMERGENCY MEDICINE

## 2025-04-12 PROCEDURE — 82248 BILIRUBIN DIRECT: CPT | Performed by: EMERGENCY MEDICINE

## 2025-04-12 PROCEDURE — 96374 THER/PROPH/DIAG INJ IV PUSH: CPT | Mod: 59

## 2025-04-12 PROCEDURE — 83690 ASSAY OF LIPASE: CPT | Performed by: EMERGENCY MEDICINE

## 2025-04-12 PROCEDURE — 85027 COMPLETE CBC AUTOMATED: CPT | Performed by: EMERGENCY MEDICINE

## 2025-04-12 PROCEDURE — 99285 EMERGENCY DEPT VISIT HI MDM: CPT | Mod: 25

## 2025-04-12 PROCEDURE — 85610 PROTHROMBIN TIME: CPT | Performed by: EMERGENCY MEDICINE

## 2025-04-12 RX ORDER — OXYCODONE AND ACETAMINOPHEN 5; 325 MG/1; MG/1
1 TABLET ORAL ONCE
Status: DISCONTINUED | OUTPATIENT
Start: 2025-04-12 | End: 2025-04-12

## 2025-04-12 RX ORDER — MORPHINE SULFATE 2 MG/ML
2 INJECTION, SOLUTION INTRAMUSCULAR; INTRAVENOUS ONCE
Status: COMPLETED | OUTPATIENT
Start: 2025-04-12 | End: 2025-04-12

## 2025-04-12 RX ADMIN — MORPHINE SULFATE 2 MG: 2 INJECTION, SOLUTION INTRAMUSCULAR; INTRAVENOUS at 02:42

## 2025-04-12 ASSESSMENT — ACTIVITIES OF DAILY LIVING (ADL)
ADLS_ACUITY_SCORE: 41

## 2025-04-12 ASSESSMENT — COLUMBIA-SUICIDE SEVERITY RATING SCALE - C-SSRS
2. HAVE YOU ACTUALLY HAD ANY THOUGHTS OF KILLING YOURSELF IN THE PAST MONTH?: NO
1. IN THE PAST MONTH, HAVE YOU WISHED YOU WERE DEAD OR WISHED YOU COULD GO TO SLEEP AND NOT WAKE UP?: NO
6. HAVE YOU EVER DONE ANYTHING, STARTED TO DO ANYTHING, OR PREPARED TO DO ANYTHING TO END YOUR LIFE?: NO

## 2025-04-12 NOTE — ED PROVIDER NOTES
EMERGENCY DEPARTMENT ENCOUNTER      NAME: Shimon Duran  AGE: 79 year old male  YOB: 1945  EVALUATION DATE & TIME: 4/12/2025  2:07 AM    ED PROVIDER: Tali Brown MD    Chief Complaint   Patient presents with    Arm Pain       FINAL IMPRESSION  1. Pain of left hand    2. Shortness of breath        MEDICAL DECISION MAKING   Shimon Duran is a 79 year old male who presents for evaluation of left-sided hand and arm pain.  Reviewed.  Patient has a history of hypertension, hyperlipidemia, coronary artery disease and stent placement 3 years ago, atrial fibrillation and chronic anticoagulation.  Seen in clinic yesterday for evaluation of left hand pain about 1.5 months and worsening in severity.  X-ray revealed some arthritis but was unclear if this would be enough to explain patient's symptoms.  There was also some consideration of tendinitis, carpal tunnel, or inflammatory arthritis.  Patient was given a splint for nighttime use and a short course of prednisone.  Plan was to continue conservative management and if no improvement, refer to hand surgery team.  He presents to the ED today with complaints of pain in his entire left arm as well as shortness of breath.  He reports that initially, the symptoms are present only in his left hand but now the discomfort extends up until his axilla on that side.  He has had some shortness of breath but denies chest pain, diaphoresis, nausea.  He cannot identify any obvious trigger for his pain and denies recent lifting, twisting, or trauma to the hand.  He is left-hand dominant.  Patient reports that prior to his stent placement, he might of had some shortness of breath but otherwise, does not remember exactly how he was feeling.  Vitals on arrival stable and reassuring.    I considered a broad differential include but not limited to atypical ACS/ischemia, unstable angina, muscular strain/sprain, arthritis, tendinitis, ulnar neuropathy, inflammatory arthritis, overuse  injury, DVT or vascular process.  No overlying erythema, warmth, fevers, or other signs/symptoms to suggest infectious etiology, inflammatory arthropathy, or septic joint.  I do have lower suspicion for neurovascular injury given distal CMS is intact.  Also low suspicion for any underlying bony fracture given x-ray at urgent care was negative yesterday and patient has no recent trauma.  Discussed options for workup and management.  We have agreed on plan for labs, EKG, chest x-ray, ultrasound of upper extremity, management of symptoms with IV analgesic.    Reassuring without evidence of ischemic changes.    ED Course as of 04/12/25 2105   Sat Apr 12, 2025   0304 CBC (+ platelets, no diff)(!)  CBC reassuring. No evidence of leukocytosis to suggest systemic infectious/inflammatory process. No acute anemia. PLTs wnl.    0305 Basic metabolic panel(!)  BMP reassuring. No evidence of PRISCILA, acidosis, or significant electrolyte derangement.    0305 N-Terminal Pro BNP Inpatient: 644  BNP within normal limits. No clinical concern for CHF exacerbation.    0305 Troponin T, High Sensitivity: 16  High sensitivity troponin below age adjusted cutoff but >6. Do ACS less likely in the setting of ECG without acute ischemic changes but given in 6-99 range and not <6, will repeat at 2 hours.    0340 US Upper Extremity Venous Duplex Left  No DVT or other abnormality.   0340 XR Chest 2 Views  I independently reviewed chest x-ray which showed no obvious pneumothorax, infiltrate, pulmonary edema, or displaced rib fractures.     0418 Hepatic function panel  LFTs reassuring. No acute elevation of bilirubin or transaminates to suggest acute hepatobiliary process.    0418 Lipase: 26  Lipase within normal limits, symptoms less likely related to acute pancreatitis.     0500 Troponin T, High Sensitivity: 14  Troponin stable. Unlikely ACS.      Workup today was very reassuring, as above.  During the course of his stay, patient did have a brief  episode of upper abdominal discomfort so I did add on LFTs and a lipase, both of which were negative.  Repeat abdominal exam is benign without any tenderness palpation and patient reported spontaneous resolution of symptoms.  Overall, I do have low suspicion for cardiovascular etiology of his symptoms today and suspect that more related to musculoskeletal process.  Patient was comfortable with plan for discharge and follow-up with hand surgery team.  We did place him in a splint for comfort and support and he did appreciate this as well.  For now, I encouraged him to continue using his medications as prescribed and follow-up closely with PCP and hand surgery.    At the end of the encounter, we reviewed the results, potential diagnoses, as well as return precautions and recommendations for follow up. I instructed Mr. Duran to return to the emergency department immediately if he develops any new or worsening symptoms and provided additional verbal discharge instructions. Mr. Duran expressed understanding and agreement with this plan of care, his questions were answered, and he was discharged in stable condition.      Additional Considerations in MDM  History:  Supplemental history from: spouse  External Record(s) reviewed: [facility] on 12-Apr-2025    Work Up:  Chart documentation includes differential diagnoses considered and any EKGs or imaging independently interpreted as specified above.   In additional to work up documented, I considered additional advanced imaging and laboratory workup but deferred after shared decision making conversation with patient/family    External Consultation(s):  Discussion of management with another provider as documented above and in ED course     Chronic Illness(es):  Care impacted by chronic illness(es): Anticoagulated State, Heart Disease, Hyperlipidemia, and Hypertension    Disposition considerations: Discharge. I recommended the patient continue their current  prescription strength medication(s): Anticoagulation and all chronic medications. I considered admission, but discharged the patient after share decision making conversation.    MIPS: Not Applicable     Sepsis/STEMI/Stroke: None      ED COURSE  2:15 AM I met the patient and performed my initial interview and exam.  3:48 AM Rechecked and updated patient on labs and imaging.      MEDICATIONS GIVEN IN THE ED  Medications   morphine (PF) injection 2 mg (2 mg Intravenous $Given 4/12/25 0242)       NEW PRESCRIPTIONS STARTED AT TODAY'S VISIT  Discharge Medication List as of 4/12/2025  5:11 AM             =================================================================    HPI:    Use of : N/A      Shimon Duran is a 79 year old male who presents via walk-in for evaluation of arm pain.    For a few weeks, the patient has been having left hand pain, but recently the pain increased in intensity. Today, the patient presented to his provider for the pain and had normal imaging and workup regarding the pain. Then he went home and the pain subsided. However this evening the patient woke again with pain all up his left arm. He also checked his blood pressure and over the course of several readings it went from the 130s to the 180s. Due to this, he decided to present to the ER for evaluation of the pain. Now, he notes a bit of shortness of breath and that his left hand is swollen.     He has a history of 1 coronary stent due to myocardial infarction. He took 2 Tylenol before going to sleep at 10:30 PM. He is left handed and has no recent injury or increase in writing/typing that may have coincided with the increased hand pain. The patient denies chest pain, neck pain, diaphoresis, and nausea.     Per Chart Review:  Visit to [facility] on 12-Apr-2025 for hand pain. XR left hand with some arthritis, but unsure if severity matches pain level. Labs ordered with results expected within a few days. Discharged with  plans for follow-up.      RELEVANT HISTORY, MEDICATIONS, & ALLERGIES   Past medical history, surgical history, family history, medications, and allergies reviewed and pertinent noted in HPI.    REVIEW OF SYSTEMS:  A complete review of systems was performed with pertinent positives and negatives noted in the HPI.     PHYSICAL EXAM:    Vitals: /64   Pulse 68   Temp 97.7  F (36.5  C)   Resp 17   Wt 99.8 kg (220 lb)   SpO2 97%   BMI 31.57 kg/m     General: Alert and interactive, comfortable appearing.  HENT: Atraumatic. Full AROM of neck. MMM.  Cardiovascular: Regular rate and rhythm.   Chest/Pulmonary: Normal work of breathing. Speaking in complete sentences. Lungs CTAB. No chest wall tenderness or deformities.  Abdomen: Soft, nondistended. Nontender without guarding or rebound.  Extremities: Normal AROM of all major joints.  Left hand: Tenderness palpation over fifth metacarpal and ulnar aspect of hand.  No overlying erythema, warmth, fluctuance.  No crepitus.  No tenderness palpation over major joints.  Normal range of motion of wrist and all 5 digits.  Brisk capillary refill all 5 digits.  2+ radial pulse.  Skin: Warm and dry. Normal skin color.   Neuro: Speech clear. CNs grossly intact. Moves all extremities spontaneously.   Psych: Normal affect/mood, cooperative, memory appropriate.      LAB  Labs Ordered and Resulted from Time of ED Arrival to Time of ED Departure   INR - Abnormal       Result Value    INR 1.29 (*)    CBC WITH PLATELETS - Abnormal    WBC Count 7.1      RBC Count 4.32 (*)     Hemoglobin 13.4      Hematocrit 38.9 (*)     MCV 90      MCH 31.0      MCHC 34.4      RDW 12.1      Platelet Count 271     BASIC METABOLIC PANEL - Abnormal    Sodium 141      Potassium 4.0      Chloride 104      Carbon Dioxide (CO2) 24      Anion Gap 13      Urea Nitrogen 13.6      Creatinine 0.75      GFR Estimate >90      Calcium 9.1      Glucose 183 (*)    TROPONIN T, HIGH SENSITIVITY - Normal    Troponin T,  High Sensitivity 16     NT PROBNP INPATIENT - Normal    N terminal Pro BNP Inpatient 644     TROPONIN T, HIGH SENSITIVITY - Normal    Troponin T, High Sensitivity 14     LIPASE - Normal    Lipase 26     HEPATIC FUNCTION PANEL - Normal    Protein Total 8.1      Albumin 4.3      Bilirubin Total 0.4      Alkaline Phosphatase 60      AST 31      ALT 21      Bilirubin Direct 0.15         RADIOLOGY  US Upper Extremity Venous Duplex Left   Final Result   IMPRESSION:    1.  No deep venous thrombosis in the left upper extremity.      XR Chest 2 Views   Final Result   IMPRESSION: No acute abnormality.          EKG  Performed at: 12-Apr-2025 02:12  Impression: Normal sinus rhythm.  No acute ischemic changes.  Normal intervals.  Rate: 92 bpm  Rhythm: Sinus  QRS Interval: 76 ms  QTc Interval: 440 ms  Comparison: 10-Feb-2023 14:51    All laboratory and imaging results and EKG's were personally reviewed and interpreted by myself prior to disposition decision.       I, Juan C Gómez, am serving as a scribe to document services personally performed by Dr. Tali Brown based on my observation and the provider's statements to me. I, Tali Brown MD attest that Juan C Gómez is acting in a scribe capacity, has observed my performance of the services and has documented them in accordance with my direction.    Tali Brown M.D.  Emergency Medicine  [facility] EMERGENCY DEPARTMENT  [facility] [facility] [address] [phone]  Dept: [phone]     Tali Brown MD  04/12/25 5540

## 2025-04-12 NOTE — ED TRIAGE NOTES
"Left arm \"ache\" for 2 hours. Pt states he has had \"hand pain\" off and on for 2 days but left arm aching started tonight. Hx of stents 3 years ago. Denies CP, SOB or diaphoresis.      Triage Assessment (Adult)       Row Name 04/12/25 0210          Triage Assessment    Airway WDL WDL        Respiratory WDL    Respiratory WDL WDL        Cardiac WDL    Cardiac WDL X        Cognitive/Neuro/Behavioral WDL    Cognitive/Neuro/Behavioral WDL WDL                     "

## 2025-04-12 NOTE — DISCHARGE INSTRUCTIONS
You were seen in the emergency department today for hand and arm pain.    Like we talked about, your x-ray, ultrasound, and labs today all looked very stable.  I am not sure what is causing the pain in your hand but I think the next step should be for you to follow-up with the hand surgery clinic for more evaluation.    To help with pain:  - Ice the injury for 20 minutes, 3-5 times per day (or up to every 2 hours as needed) for the first 24-72 hours. You can either use an ice pack or plastic bag filled with ice, cover either one with a damp cloth to prevent the cold from burning your skin.   - Wear the brace to provide some support  - You may take 650-1000mg of Acetaminophen (Tylenol).  Please do not use more than 3000 mg in a 24 hour period. Tylenol is an effective drug when taken at the prescribed dosages but can cause bodily injury including liver damage if taken too often or at too high of dose.  - You can take 600mg of Ibuprofen (Motrin, Advil) by mouth with food every 6-8 hours (no more than 3200mg in 24hrs).    - You can take one or the other every 3 hours while awake (such that each is taken every 6 hours). For example, if you take Tylenol when you get home then you would take ibuprofen 3 hours later followed by another Tylenol dose 3 hours after that. Write down the times you are taking both medications to ensure appropriate time in between doses.    Please return to the Emergency Department if you have uncontrolled/worsening pain, difficulty breathing, numbness, inability to keep food/fluids down, or any other new or concerning symptoms. Otherwise please follow up with [place] [facility] for recheck.    Thank you for choosing [facility]. It was a pleasure taking care of you today!  - Dr. Tali Brown

## 2025-05-03 ENCOUNTER — APPOINTMENT (OUTPATIENT)
Dept: MRI IMAGING | Facility: HOSPITAL | Age: 80
End: 2025-05-03
Attending: STUDENT IN AN ORGANIZED HEALTH CARE EDUCATION/TRAINING PROGRAM
Payer: COMMERCIAL

## 2025-05-03 ENCOUNTER — HOSPITAL ENCOUNTER (EMERGENCY)
Facility: HOSPITAL | Age: 80
Discharge: HOME | End: 2025-05-03
Attending: STUDENT IN AN ORGANIZED HEALTH CARE EDUCATION/TRAINING PROGRAM | Admitting: STUDENT IN AN ORGANIZED HEALTH CARE EDUCATION/TRAINING PROGRAM
Payer: COMMERCIAL

## 2025-05-03 VITALS
HEART RATE: 88 BPM | DIASTOLIC BLOOD PRESSURE: 86 MMHG | BODY MASS INDEX: 32.04 KG/M2 | RESPIRATION RATE: 16 BRPM | SYSTOLIC BLOOD PRESSURE: 157 MMHG | WEIGHT: 223.8 LBS | OXYGEN SATURATION: 97 % | TEMPERATURE: 98.2 F | HEIGHT: 70 IN

## 2025-05-03 DIAGNOSIS — M79.642 PAIN OF LEFT HAND: ICD-10-CM

## 2025-05-03 LAB
ALBUMIN SERPL BCG-MCNC: 4 G/DL (ref 3.5–5.2)
ALP SERPL-CCNC: 53 U/L (ref 40–150)
ALT SERPL W P-5'-P-CCNC: 24 U/L (ref 0–70)
ANION GAP SERPL CALCULATED.3IONS-SCNC: 8 MMOL/L (ref 7–15)
AST SERPL W P-5'-P-CCNC: 26 U/L (ref 0–45)
BASOPHILS # BLD AUTO: 0 10E3/UL (ref 0–0.2)
BASOPHILS NFR BLD AUTO: 1 %
BILIRUB SERPL-MCNC: 0.5 MG/DL
BUN SERPL-MCNC: 11.6 MG/DL (ref 8–23)
CALCIUM SERPL-MCNC: 8.8 MG/DL (ref 8.8–10.4)
CHLORIDE SERPL-SCNC: 106 MMOL/L (ref 98–107)
CREAT SERPL-MCNC: 0.74 MG/DL (ref 0.67–1.17)
CRP SERPL-MCNC: 7.3 MG/L
EGFRCR SERPLBLD CKD-EPI 2021: >90 ML/MIN/1.73M2
EOSINOPHIL # BLD AUTO: 0.3 10E3/UL (ref 0–0.7)
EOSINOPHIL NFR BLD AUTO: 3 %
ERYTHROCYTE [DISTWIDTH] IN BLOOD BY AUTOMATED COUNT: 12.1 % (ref 10–15)
ERYTHROCYTE [SEDIMENTATION RATE] IN BLOOD BY WESTERGREN METHOD: 26 MM/HR (ref 0–20)
GLUCOSE SERPL-MCNC: 136 MG/DL (ref 70–99)
HCO3 SERPL-SCNC: 26 MMOL/L (ref 22–29)
HCT VFR BLD AUTO: 39 % (ref 40–53)
HGB BLD-MCNC: 13.2 G/DL (ref 13.3–17.7)
IMM GRANULOCYTES # BLD: 0 10E3/UL
IMM GRANULOCYTES NFR BLD: 0 %
LYMPHOCYTES # BLD AUTO: 1.6 10E3/UL (ref 0.8–5.3)
LYMPHOCYTES NFR BLD AUTO: 21 %
MCH RBC QN AUTO: 30.6 PG (ref 26.5–33)
MCHC RBC AUTO-ENTMCNC: 33.8 G/DL (ref 31.5–36.5)
MCV RBC AUTO: 91 FL (ref 78–100)
MONOCYTES # BLD AUTO: 0.7 10E3/UL (ref 0–1.3)
MONOCYTES NFR BLD AUTO: 9 %
NEUTROPHILS # BLD AUTO: 4.9 10E3/UL (ref 1.6–8.3)
NEUTROPHILS NFR BLD AUTO: 66 %
NRBC # BLD AUTO: 0 10E3/UL
NRBC BLD AUTO-RTO: 0 /100
PLATELET # BLD AUTO: 263 10E3/UL (ref 150–450)
POTASSIUM SERPL-SCNC: 4 MMOL/L (ref 3.4–5.3)
PROT SERPL-MCNC: 7.6 G/DL (ref 6.4–8.3)
RBC # BLD AUTO: 4.31 10E6/UL (ref 4.4–5.9)
SODIUM SERPL-SCNC: 140 MMOL/L (ref 135–145)
WBC # BLD AUTO: 7.4 10E3/UL (ref 4–11)

## 2025-05-03 PROCEDURE — 250N000011 HC RX IP 250 OP 636: Performed by: STUDENT IN AN ORGANIZED HEALTH CARE EDUCATION/TRAINING PROGRAM

## 2025-05-03 PROCEDURE — 96376 TX/PRO/DX INJ SAME DRUG ADON: CPT

## 2025-05-03 PROCEDURE — 73220 MRI UPPR EXTREMITY W/O&W/DYE: CPT | Mod: LT

## 2025-05-03 PROCEDURE — 96374 THER/PROPH/DIAG INJ IV PUSH: CPT | Mod: 59

## 2025-05-03 PROCEDURE — 99285 EMERGENCY DEPT VISIT HI MDM: CPT | Mod: 25

## 2025-05-03 PROCEDURE — A9585 GADOBUTROL INJECTION: HCPCS | Performed by: STUDENT IN AN ORGANIZED HEALTH CARE EDUCATION/TRAINING PROGRAM

## 2025-05-03 PROCEDURE — 255N000002 HC RX 255 OP 636: Performed by: STUDENT IN AN ORGANIZED HEALTH CARE EDUCATION/TRAINING PROGRAM

## 2025-05-03 PROCEDURE — 73223 MRI JOINT UPR EXTR W/O&W/DYE: CPT | Mod: LT

## 2025-05-03 PROCEDURE — 86140 C-REACTIVE PROTEIN: CPT | Performed by: STUDENT IN AN ORGANIZED HEALTH CARE EDUCATION/TRAINING PROGRAM

## 2025-05-03 PROCEDURE — 85652 RBC SED RATE AUTOMATED: CPT | Performed by: STUDENT IN AN ORGANIZED HEALTH CARE EDUCATION/TRAINING PROGRAM

## 2025-05-03 PROCEDURE — 36415 COLL VENOUS BLD VENIPUNCTURE: CPT | Performed by: STUDENT IN AN ORGANIZED HEALTH CARE EDUCATION/TRAINING PROGRAM

## 2025-05-03 PROCEDURE — 96375 TX/PRO/DX INJ NEW DRUG ADDON: CPT

## 2025-05-03 PROCEDURE — 85004 AUTOMATED DIFF WBC COUNT: CPT | Performed by: STUDENT IN AN ORGANIZED HEALTH CARE EDUCATION/TRAINING PROGRAM

## 2025-05-03 PROCEDURE — 84295 ASSAY OF SERUM SODIUM: CPT | Performed by: STUDENT IN AN ORGANIZED HEALTH CARE EDUCATION/TRAINING PROGRAM

## 2025-05-03 RX ORDER — PREDNISONE 20 MG/1
TABLET ORAL
Qty: 10 TABLET | Refills: 0 | Status: SHIPPED | OUTPATIENT
Start: 2025-05-03

## 2025-05-03 RX ORDER — OXYCODONE HYDROCHLORIDE 5 MG/1
5 TABLET ORAL EVERY 6 HOURS PRN
Qty: 8 TABLET | Refills: 0 | Status: SHIPPED | OUTPATIENT
Start: 2025-05-03 | End: 2025-05-06

## 2025-05-03 RX ORDER — MORPHINE SULFATE 4 MG/ML
4 INJECTION, SOLUTION INTRAMUSCULAR; INTRAVENOUS ONCE
Status: COMPLETED | OUTPATIENT
Start: 2025-05-03 | End: 2025-05-03

## 2025-05-03 RX ORDER — ONDANSETRON 2 MG/ML
4 INJECTION INTRAMUSCULAR; INTRAVENOUS ONCE
Status: COMPLETED | OUTPATIENT
Start: 2025-05-03 | End: 2025-05-03

## 2025-05-03 RX ORDER — GADOBUTROL 604.72 MG/ML
10 INJECTION INTRAVENOUS ONCE
Status: COMPLETED | OUTPATIENT
Start: 2025-05-03 | End: 2025-05-03

## 2025-05-03 RX ORDER — METHYLPREDNISOLONE SODIUM SUCCINATE 125 MG/2ML
125 INJECTION INTRAMUSCULAR; INTRAVENOUS ONCE
Status: COMPLETED | OUTPATIENT
Start: 2025-05-03 | End: 2025-05-03

## 2025-05-03 RX ADMIN — METHYLPREDNISOLONE SODIUM SUCCINATE 125 MG: 125 INJECTION, POWDER, FOR SOLUTION INTRAMUSCULAR; INTRAVENOUS at 08:29

## 2025-05-03 RX ADMIN — MORPHINE SULFATE 4 MG: 4 INJECTION, SOLUTION INTRAMUSCULAR; INTRAVENOUS at 08:34

## 2025-05-03 RX ADMIN — GADOBUTROL 10 ML: 604.72 INJECTION INTRAVENOUS at 07:12

## 2025-05-03 RX ADMIN — MORPHINE SULFATE 4 MG: 4 INJECTION, SOLUTION INTRAMUSCULAR; INTRAVENOUS at 03:56

## 2025-05-03 ASSESSMENT — ACTIVITIES OF DAILY LIVING (ADL)
ADLS_ACUITY_SCORE: 41

## 2025-05-03 NOTE — ED TRIAGE NOTES
Patient reports left hand swelling from wrist to fingers. Patient states he was seen recently for hand injury to this hand, patient was lifting heavy object and twisted wrist. Patient denies numbness/tingling. Reports it is painful and cannot fully grasp objects without worsening pain. Swelling does not appear to go past wrist.     Triage Assessment (Adult)       Row Name 05/03/25 0223          Triage Assessment    Airway WDL WDL        Respiratory WDL    Respiratory WDL WDL        Peripheral/Neurovascular WDL    Peripheral Neurovascular WDL X;neurovascular assessment upper        LUE Neurovascular Assessment    Temperature LUE cool     Color LUE red     Sensation LUE tenderness present;no tingling;no numbness        RUE Neurovascular Assessment    Temperature RUE warm     Sensation RUE no tenderness;no numbness;no tingling

## 2025-05-03 NOTE — DISCHARGE INSTRUCTIONS
As we discussed, please take pain medication as well as steroids as prescribed.    Call Dr. Crews's office in the morning for follow-up.  Return for any worsening pain including fevers, worsening pain, redness and streaking up your arm or any other concerning symptoms

## 2025-05-03 NOTE — ED PROVIDER NOTES
"  Emergency Department Encounter         FINAL IMPRESSION:  Hand pain/swelling          ED COURSE AND MEDICAL DECISION MAKING            Patient is a 80-year-old male here with left hand pain and swelling.  Was evaluated on the 12th of last month with an extensive workup which was overall reassuring.  Saw some orthopedics for the last week with a hand MRI that showed \"a bone bruise.\"  I am unable to see these images.    Arrival tonight with worsening pain and swelling of the left hand.  No redness fevers chills nausea vomiting.  No other systemic symptoms.  Reports no overuse in the left hand has been using a hand/wrist splint without difficulty.  No pain medications at home.    On examination patient's left hand is generally swollen compared to right.  Decreased range of motion of the 5th, 4th and 3rd fingers with patient unable to move them which he reports is new.  No obvious redness.  Small abrasion to the posterior aspect of thenar aspect of the thumb.    - MRI showing nonspecific changes  - spoke [facility] hand surgery, rec'd steroids and to call clinic on Monday, return precautions discussed                   Medical Decision Making  I obtained history from Family Member/Significant Other  Discharge. I prescribed additional prescription strength medication(s) as charted. See documentation for any additional details.    MIPS (CTPE, Dental pain, Holley, Sinusitis, Asthma/COPD, Head Trauma): Not Applicable    SEPSIS: None        At the conclusion of the encounter I discussed the results of all the tests and the disposition. The questions were answered. The patient or family acknowledged understanding and was agreeable with the care plan.        MEDICATIONS GIVEN IN THE EMERGENCY DEPARTMENT:  Medications   morphine (PF) injection 4 mg (has no administration in time range)   ondansetron (ZOFRAN) injection 4 mg (has no administration in time range)       NEW PRESCRIPTIONS STARTED AT TODAY'S ED VISIT:  New Prescriptions " "   No medications on file       HPI     80-year-old hypertensive hyperlipidemic male here with left hand pain.  Trauma over a month ago.  Recent MRI showing bone bruise.  Here with worsening pain swelling and decreased range of motion of his fingers      MEDICAL HISTORY     Past Medical History:   Diagnosis Date    Hyperlipidemia     Hypertension        Past Surgical History:   Procedure Laterality Date    CHOLECYSTECTOMY      CV CORONARY ANGIOGRAM N/A 10/8/2020    Procedure: Coronary Angiogram;  Surgeon: Alvin Cannon MD;  Location: [facility] Cath Lab;  Service: Cardiology       Social History     Tobacco Use    Smoking status: Never    Smokeless tobacco: Never   Substance Use Topics    Alcohol use: Not Currently    Drug use: Never       acetaminophen (TYLENOL) 500 MG tablet  amLODIPine-benazepril (LOTREL) 10-40 mg per capsule  apixaban ANTICOAGULANT (ELIQUIS) 5 MG tablet  atorvastatin (LIPITOR) 80 MG tablet  Cholecalciferol (VITAMIN D) 50 MCG (2000 UT) CAPS  clotrimazole (LOTRIMIN) 1 % cream  diclofenac (VOLTAREN) 1 % topical gel  metoprolol succinate ER (TOPROL XL) 25 MG 24 hr tablet  multivitamin, therapeutic (THERA-VIT) TABS tablet  nitroGLYcerin (NITROSTAT) 0.4 MG sublingual tablet  omeprazole (PRILOSEC) 20 MG capsule            PHYSICAL EXAM     BP (!) 152/70   Pulse 88   Temp 98.2  F (36.8  C) (Oral)   Resp 16   Ht 1.778 m (5' 10\")   Wt 101.5 kg (223 lb 12.8 oz)   SpO2 94%   BMI 32.11 kg/m        PHYSICAL EXAM:     General: Patient appears well, nontoxic, comfortable  HEENT: Moist mucous membranes,  No head trauma.    Cardiovascular: Normal rate, normal rhythm, no extremity edema.  No appreciable murmur.  Respiratory: No signs of respiratory distress, lungs are clear to auscultation bilaterally with no wheezes rhonchi or rales.  Abdominal: Soft, nontender, nondistended, no palpable masses, no guarding, no rebound  Musculoskeletal: generalized swelling of the L hand compared to R. Decreased " ROM of the fingers, neuro intact, normal pulses, no redness or warmth, no bullae, no concerns for abscess  Neurological: Alert and oriented, grossly neurologically intact.  Psychological: Normal affect and mood.  Integument: No rashes appreciated          RESULTS       Labs Ordered and Resulted from Time of ED Arrival to Time of ED Departure - No data to display    MR Hand Left w Contrast    (Results Pending)   MR Wrist Left w Contrast    (Results Pending)         PROCEDURES:  Procedures:  Procedures         Manjit Magdaleno DO  Emergency Medicine  [facility] EMERGENCY DEPARTMENT       Manjit Magdaleno DO  05/03/25 3603

## 2025-05-03 NOTE — ED NOTES
"His pain is better. However, he started to have upper abdominal \"cramping.\" He states this is the same reaction he had the last time her was here for this hand pain and received morphine. At that time it went away on it's own of some time.  "

## 2025-06-03 ENCOUNTER — LAB REQUISITION (OUTPATIENT)
Dept: LAB | Facility: CLINIC | Age: 80
End: 2025-06-03

## 2025-06-03 DIAGNOSIS — M25.539 PAIN IN UNSPECIFIED WRIST: ICD-10-CM

## 2025-06-03 LAB
ANION GAP SERPL CALCULATED.3IONS-SCNC: 12 MMOL/L (ref 7–15)
BUN SERPL-MCNC: 15.3 MG/DL (ref 8–23)
CALCIUM SERPL-MCNC: 9.2 MG/DL (ref 8.8–10.4)
CHLORIDE SERPL-SCNC: 107 MMOL/L (ref 98–107)
CREAT SERPL-MCNC: 0.71 MG/DL (ref 0.67–1.17)
EGFRCR SERPLBLD CKD-EPI 2021: >90 ML/MIN/1.73M2
ERYTHROCYTE [SEDIMENTATION RATE] IN BLOOD BY WESTERGREN METHOD: 24 MM/HR (ref 0–20)
GLUCOSE SERPL-MCNC: 149 MG/DL (ref 70–99)
HCO3 SERPL-SCNC: 21 MMOL/L (ref 22–29)
POTASSIUM SERPL-SCNC: 4.2 MMOL/L (ref 3.4–5.3)
SODIUM SERPL-SCNC: 140 MMOL/L (ref 135–145)
URATE SERPL-MCNC: 5.7 MG/DL (ref 3.4–7)

## 2025-06-03 PROCEDURE — 85652 RBC SED RATE AUTOMATED: CPT | Performed by: FAMILY MEDICINE

## 2025-06-03 PROCEDURE — 84550 ASSAY OF BLOOD/URIC ACID: CPT | Performed by: FAMILY MEDICINE

## 2025-06-03 PROCEDURE — 82947 ASSAY GLUCOSE BLOOD QUANT: CPT | Performed by: FAMILY MEDICINE
